# Patient Record
Sex: FEMALE | Race: WHITE | Employment: OTHER | URBAN - METROPOLITAN AREA
[De-identification: names, ages, dates, MRNs, and addresses within clinical notes are randomized per-mention and may not be internally consistent; named-entity substitution may affect disease eponyms.]

---

## 2021-12-07 ENCOUNTER — OFFICE VISIT (OUTPATIENT)
Dept: FAMILY MEDICINE CLINIC | Age: 76
End: 2021-12-07
Payer: MEDICARE

## 2021-12-07 VITALS
RESPIRATION RATE: 14 BRPM | HEART RATE: 68 BPM | HEIGHT: 57 IN | TEMPERATURE: 97.5 F | DIASTOLIC BLOOD PRESSURE: 80 MMHG | SYSTOLIC BLOOD PRESSURE: 126 MMHG | WEIGHT: 113 LBS | OXYGEN SATURATION: 95 % | BODY MASS INDEX: 24.38 KG/M2

## 2021-12-07 DIAGNOSIS — I10 PRIMARY HYPERTENSION: Primary | ICD-10-CM

## 2021-12-07 PROCEDURE — 1036F TOBACCO NON-USER: CPT | Performed by: FAMILY MEDICINE

## 2021-12-07 PROCEDURE — 1123F ACP DISCUSS/DSCN MKR DOCD: CPT | Performed by: FAMILY MEDICINE

## 2021-12-07 PROCEDURE — G8420 CALC BMI NORM PARAMETERS: HCPCS | Performed by: FAMILY MEDICINE

## 2021-12-07 PROCEDURE — G8400 PT W/DXA NO RESULTS DOC: HCPCS | Performed by: FAMILY MEDICINE

## 2021-12-07 PROCEDURE — 4040F PNEUMOC VAC/ADMIN/RCVD: CPT | Performed by: FAMILY MEDICINE

## 2021-12-07 PROCEDURE — 99203 OFFICE O/P NEW LOW 30 MIN: CPT | Performed by: FAMILY MEDICINE

## 2021-12-07 PROCEDURE — G8427 DOCREV CUR MEDS BY ELIG CLIN: HCPCS | Performed by: FAMILY MEDICINE

## 2021-12-07 PROCEDURE — 1090F PRES/ABSN URINE INCON ASSESS: CPT | Performed by: FAMILY MEDICINE

## 2021-12-07 PROCEDURE — G8484 FLU IMMUNIZE NO ADMIN: HCPCS | Performed by: FAMILY MEDICINE

## 2021-12-07 RX ORDER — ASPIRIN 81 MG/1
81 TABLET ORAL DAILY
COMMUNITY

## 2021-12-07 RX ORDER — LISINOPRIL AND HYDROCHLOROTHIAZIDE 20; 12.5 MG/1; MG/1
1 TABLET ORAL DAILY
Status: ON HOLD | COMMUNITY
Start: 2021-09-23 | End: 2022-03-16 | Stop reason: HOSPADM

## 2021-12-07 SDOH — ECONOMIC STABILITY: FOOD INSECURITY: WITHIN THE PAST 12 MONTHS, THE FOOD YOU BOUGHT JUST DIDN'T LAST AND YOU DIDN'T HAVE MONEY TO GET MORE.: NEVER TRUE

## 2021-12-07 SDOH — ECONOMIC STABILITY: FOOD INSECURITY: WITHIN THE PAST 12 MONTHS, YOU WORRIED THAT YOUR FOOD WOULD RUN OUT BEFORE YOU GOT MONEY TO BUY MORE.: NEVER TRUE

## 2021-12-07 ASSESSMENT — PATIENT HEALTH QUESTIONNAIRE - PHQ9
2. FEELING DOWN, DEPRESSED OR HOPELESS: 0
SUM OF ALL RESPONSES TO PHQ9 QUESTIONS 1 & 2: 0
1. LITTLE INTEREST OR PLEASURE IN DOING THINGS: 0
SUM OF ALL RESPONSES TO PHQ QUESTIONS 1-9: 0

## 2021-12-07 ASSESSMENT — SOCIAL DETERMINANTS OF HEALTH (SDOH): HOW HARD IS IT FOR YOU TO PAY FOR THE VERY BASICS LIKE FOOD, HOUSING, MEDICAL CARE, AND HEATING?: NOT HARD AT ALL

## 2021-12-07 NOTE — PROGRESS NOTES
12/7/2021    Chief Complaint   Patient presents with   [facility] Doctor     referred by melissa MORRISON    Liane Rubio is a 67 y.o. patient that presents today for:    Hypertension: Patient is here for follow up chronic hypertension. Patient is  compliant with lifestyle modifications. Patient is  well controlled. Patient denies chest pain, diaphoresis, dyspnea, dyspnea on exertion, peripheral edema, palpitations, HA, visual issues. Taking meds as prescribed. No adverse effects. Patient's past medical, surgical, social and/or family history reviewed, updated in chart, and are non-contributory (unless otherwise stated). Medications and allergies also reviewed and updated in chart. ROS negative unless otherwise specified    Physical Exam  Temp Readings from Last 3 Encounters:   12/07/21 97.5 °F (36.4 °C)     Wt Readings from Last 3 Encounters:   12/07/21 113 lb (51.3 kg)     BP Readings from Last 3 Encounters:   12/07/21 126/80     Pulse Readings from Last 3 Encounters:   12/07/21 68       General appearance: alert, well appearing, and in no distress, oriented to person, place, and time and normal appearing weight. CVS exam: normal rate, regular rhythm, normal S1, S2, no murmurs, rubs, clicks or gallops. Radial pulses 2+ bilateral.  PT/DP pulse 2+ bilat. No C/C/E    Chest: clear to auscultation, no wheezes, rales or rhonchi, symmetric air entry. Abdomen: Soft, non-tender, non-distended, positive BS in all 4 quadrants    Extremities:Dorsalis pedis pulses palpated bilaterally, no clubbing, cyanosis, edema or erythema,     SKIN: no lesions, jaundice, petechiae, pallor, cyanosis, ecchymosis    NEURO: gross motor exam normal by observation, gait normal    Mental status - alert, oriented to person, place, and time, normal mood, behavior, speech, dress, motor activity, and thought processes      Assessment/Plan  Naima Ramos was seen today for established new doctor.     Diagnoses and all orders for this visit:    Primary hypertension  -     CBC; Future  -     Comprehensive Metabolic Panel; Future  -     Lipid Panel; Future  -     TSH without Reflex; Future          Return in about 6 months (around 6/7/2022) for AWV, schedule labs in 1 month . Libby Camejo, DO    Call or go to ED immediately if symptoms worsen or persist.    Educational materials and/or home exercises printed for patient's review and were included in patient instructions on his/her After Visit Summary and given to patient at the end of visit. Counseled regarding above diagnosis, including possible risks and complications,  especially if left uncontrolled. Counseled regarding the possible side effects, risks, benefits and alternatives to treatment; patient and/or guardian verbalizes understanding, agrees, feels comfortable with and wishes to proceed with above treatment plan. Advised patient to call with any new medication issues, and read all Rx info from pharmacy to assure aware of all possible risks and side effects of medication before taking. Reviewed age and gender appropriate health screening exams and vaccinations. Advised patient regarding importance of keeping up with recommended health maintenance and to schedule as soon as possible if overdue, as this is important in assessing for undiagnosed pathology, especially cancer, as well as protecting against potentially harmful/life threatening disease. Patient and/or guardian verbalizes understanding and agrees with above counseling, assessment and plan. All questions answered.

## 2022-01-05 DIAGNOSIS — I10 PRIMARY HYPERTENSION: ICD-10-CM

## 2022-01-05 LAB
ALBUMIN SERPL-MCNC: 3.9 G/DL (ref 3.5–5.2)
ALP BLD-CCNC: 56 U/L (ref 35–104)
ALT SERPL-CCNC: 36 U/L (ref 0–32)
ANION GAP SERPL CALCULATED.3IONS-SCNC: 12 MMOL/L (ref 7–16)
AST SERPL-CCNC: 32 U/L (ref 0–31)
BILIRUB SERPL-MCNC: 0.6 MG/DL (ref 0–1.2)
BUN BLDV-MCNC: 13 MG/DL (ref 6–23)
CALCIUM SERPL-MCNC: 9.3 MG/DL (ref 8.6–10.2)
CHLORIDE BLD-SCNC: 102 MMOL/L (ref 98–107)
CHOLESTEROL, TOTAL: 234 MG/DL (ref 0–199)
CO2: 25 MMOL/L (ref 22–29)
CREAT SERPL-MCNC: 0.8 MG/DL (ref 0.5–1)
GFR AFRICAN AMERICAN: >60
GFR NON-AFRICAN AMERICAN: >60 ML/MIN/1.73
GLUCOSE BLD-MCNC: 98 MG/DL (ref 74–99)
HCT VFR BLD CALC: 38 % (ref 34–48)
HDLC SERPL-MCNC: 70 MG/DL
HEMOGLOBIN: 12.2 G/DL (ref 11.5–15.5)
LDL CHOLESTEROL CALCULATED: 147 MG/DL (ref 0–99)
MCH RBC QN AUTO: 29.3 PG (ref 26–35)
MCHC RBC AUTO-ENTMCNC: 32.1 % (ref 32–34.5)
MCV RBC AUTO: 91.1 FL (ref 80–99.9)
PDW BLD-RTO: 14.1 FL (ref 11.5–15)
PLATELET # BLD: 305 E9/L (ref 130–450)
PMV BLD AUTO: 10.6 FL (ref 7–12)
POTASSIUM SERPL-SCNC: 3.8 MMOL/L (ref 3.5–5)
RBC # BLD: 4.17 E12/L (ref 3.5–5.5)
SODIUM BLD-SCNC: 139 MMOL/L (ref 132–146)
TOTAL PROTEIN: 6.5 G/DL (ref 6.4–8.3)
TRIGL SERPL-MCNC: 87 MG/DL (ref 0–149)
TSH SERPL DL<=0.05 MIU/L-ACNC: 2.17 UIU/ML (ref 0.27–4.2)
VLDLC SERPL CALC-MCNC: 17 MG/DL
WBC # BLD: 6.7 E9/L (ref 4.5–11.5)

## 2022-03-11 ENCOUNTER — APPOINTMENT (OUTPATIENT)
Dept: GENERAL RADIOLOGY | Age: 77
DRG: 280 | End: 2022-03-11
Payer: MEDICARE

## 2022-03-11 ENCOUNTER — APPOINTMENT (OUTPATIENT)
Dept: CT IMAGING | Age: 77
DRG: 280 | End: 2022-03-11
Payer: MEDICARE

## 2022-03-11 ENCOUNTER — HOSPITAL ENCOUNTER (INPATIENT)
Age: 77
LOS: 4 days | Discharge: HOME | DRG: 280 | End: 2022-03-16
Attending: EMERGENCY MEDICINE | Admitting: INTERNAL MEDICINE
Payer: MEDICARE

## 2022-03-11 DIAGNOSIS — R06.09 DYSPNEA ON EXERTION: ICD-10-CM

## 2022-03-11 DIAGNOSIS — I24.9 ACS (ACUTE CORONARY SYNDROME) (HCC): Primary | ICD-10-CM

## 2022-03-11 DIAGNOSIS — I31.39 PERICARDIAL EFFUSION: ICD-10-CM

## 2022-03-11 LAB
ALBUMIN SERPL-MCNC: 3.8 G/DL (ref 3.5–5.2)
ALP BLD-CCNC: 88 U/L (ref 35–104)
ALT SERPL-CCNC: 174 U/L (ref 0–32)
ANION GAP SERPL CALCULATED.3IONS-SCNC: 16 MMOL/L (ref 7–16)
APTT: 22.2 SEC (ref 24.5–35.1)
AST SERPL-CCNC: 173 U/L (ref 0–31)
BASOPHILS ABSOLUTE: 0.14 E9/L (ref 0–0.2)
BASOPHILS RELATIVE PERCENT: 1.2 % (ref 0–2)
BILIRUB SERPL-MCNC: 0.6 MG/DL (ref 0–1.2)
BUN BLDV-MCNC: 18 MG/DL (ref 6–23)
CALCIUM SERPL-MCNC: 9.3 MG/DL (ref 8.6–10.2)
CHLORIDE BLD-SCNC: 100 MMOL/L (ref 98–107)
CO2: 19 MMOL/L (ref 22–29)
CREAT SERPL-MCNC: 0.7 MG/DL (ref 0.5–1)
EOSINOPHILS ABSOLUTE: 0.2 E9/L (ref 0.05–0.5)
EOSINOPHILS RELATIVE PERCENT: 1.7 % (ref 0–6)
GFR AFRICAN AMERICAN: >60
GFR NON-AFRICAN AMERICAN: >60 ML/MIN/1.73
GLUCOSE BLD-MCNC: 127 MG/DL (ref 74–99)
HCT VFR BLD CALC: 43.5 % (ref 34–48)
HEMOGLOBIN: 14.3 G/DL (ref 11.5–15.5)
IMMATURE GRANULOCYTES #: 0.05 E9/L
IMMATURE GRANULOCYTES %: 0.4 % (ref 0–5)
INR BLD: 1
LYMPHOCYTES ABSOLUTE: 1.96 E9/L (ref 1.5–4)
LYMPHOCYTES RELATIVE PERCENT: 16.7 % (ref 20–42)
MCH RBC QN AUTO: 28.8 PG (ref 26–35)
MCHC RBC AUTO-ENTMCNC: 32.9 % (ref 32–34.5)
MCV RBC AUTO: 87.5 FL (ref 80–99.9)
MONOCYTES ABSOLUTE: 1.01 E9/L (ref 0.1–0.95)
MONOCYTES RELATIVE PERCENT: 8.6 % (ref 2–12)
NEUTROPHILS ABSOLUTE: 8.35 E9/L (ref 1.8–7.3)
NEUTROPHILS RELATIVE PERCENT: 71.4 % (ref 43–80)
PDW BLD-RTO: 14.8 FL (ref 11.5–15)
PLATELET # BLD: 346 E9/L (ref 130–450)
PMV BLD AUTO: 10.5 FL (ref 7–12)
POTASSIUM REFLEX MAGNESIUM: 4.1 MMOL/L (ref 3.5–5)
PRO-BNP: ABNORMAL PG/ML (ref 0–450)
PROTHROMBIN TIME: 10.8 SEC (ref 9.3–12.4)
RBC # BLD: 4.97 E12/L (ref 3.5–5.5)
SODIUM BLD-SCNC: 135 MMOL/L (ref 132–146)
TOTAL PROTEIN: 6.8 G/DL (ref 6.4–8.3)
TROPONIN, HIGH SENSITIVITY: 352 NG/L (ref 0–9)
WBC # BLD: 11.7 E9/L (ref 4.5–11.5)

## 2022-03-11 PROCEDURE — 99285 EMERGENCY DEPT VISIT HI MDM: CPT

## 2022-03-11 PROCEDURE — 85610 PROTHROMBIN TIME: CPT

## 2022-03-11 PROCEDURE — 6360000002 HC RX W HCPCS: Performed by: EMERGENCY MEDICINE

## 2022-03-11 PROCEDURE — 84484 ASSAY OF TROPONIN QUANT: CPT

## 2022-03-11 PROCEDURE — 71046 X-RAY EXAM CHEST 2 VIEWS: CPT

## 2022-03-11 PROCEDURE — 83880 ASSAY OF NATRIURETIC PEPTIDE: CPT

## 2022-03-11 PROCEDURE — 80053 COMPREHEN METABOLIC PANEL: CPT

## 2022-03-11 PROCEDURE — 36415 COLL VENOUS BLD VENIPUNCTURE: CPT

## 2022-03-11 PROCEDURE — 85025 COMPLETE CBC W/AUTO DIFF WBC: CPT

## 2022-03-11 PROCEDURE — 85730 THROMBOPLASTIN TIME PARTIAL: CPT

## 2022-03-11 PROCEDURE — 96374 THER/PROPH/DIAG INJ IV PUSH: CPT

## 2022-03-11 PROCEDURE — 71275 CT ANGIOGRAPHY CHEST: CPT

## 2022-03-11 PROCEDURE — 6360000004 HC RX CONTRAST MEDICATION: Performed by: RADIOLOGY

## 2022-03-11 PROCEDURE — 6370000000 HC RX 637 (ALT 250 FOR IP): Performed by: EMERGENCY MEDICINE

## 2022-03-11 RX ORDER — HEPARIN SODIUM 1000 [USP'U]/ML
2900 INJECTION, SOLUTION INTRAVENOUS; SUBCUTANEOUS PRN
Status: DISCONTINUED | OUTPATIENT
Start: 2022-03-11 | End: 2022-03-12

## 2022-03-11 RX ORDER — HEPARIN SODIUM 10000 [USP'U]/100ML
5-30 INJECTION, SOLUTION INTRAVENOUS CONTINUOUS
Status: DISCONTINUED | OUTPATIENT
Start: 2022-03-11 | End: 2022-03-12

## 2022-03-11 RX ORDER — ASPIRIN 81 MG/1
324 TABLET, CHEWABLE ORAL ONCE
Status: COMPLETED | OUTPATIENT
Start: 2022-03-11 | End: 2022-03-11

## 2022-03-11 RX ORDER — HEPARIN SODIUM 1000 [USP'U]/ML
2900 INJECTION, SOLUTION INTRAVENOUS; SUBCUTANEOUS ONCE
Status: COMPLETED | OUTPATIENT
Start: 2022-03-11 | End: 2022-03-11

## 2022-03-11 RX ORDER — HEPARIN SODIUM 1000 [USP'U]/ML
1500 INJECTION, SOLUTION INTRAVENOUS; SUBCUTANEOUS PRN
Status: DISCONTINUED | OUTPATIENT
Start: 2022-03-11 | End: 2022-03-12

## 2022-03-11 RX ADMIN — HEPARIN SODIUM 12 UNITS/KG/HR: 10000 INJECTION, SOLUTION INTRAVENOUS at 17:54

## 2022-03-11 RX ADMIN — IOPAMIDOL 75 ML: 755 INJECTION, SOLUTION INTRAVENOUS at 18:54

## 2022-03-11 RX ADMIN — HEPARIN SODIUM 2900 UNITS: 1000 INJECTION, SOLUTION INTRAVENOUS; SUBCUTANEOUS at 17:53

## 2022-03-11 RX ADMIN — ASPIRIN 81 MG 324 MG: 81 TABLET ORAL at 17:46

## 2022-03-11 NOTE — ED PROVIDER NOTES
HPI:  3/11/22,   Time: 4:05 PM JESSY Raymond is a 67 y.o. female presenting to the ED for sob, beginning 1 month ago. The complaint has been intermittent, mild in severity, and worsened by moderate exertion. Better with rest, no cp/n/v/d/abd pain/cough/congestion/fever/chills/sweats. No hx same. No cardiac hx. Never had stress test.  [employer] private vehicle    Review of Systems:   Pertinent positives and negatives are stated within HPI, all other systems reviewed and are negative.          --------------------------------------------- PAST HISTORY ---------------------------------------------  Past Medical History:  has a past medical history of Hypertension. Past Surgical History:  has a past surgical history that includes Hysterectomy, total abdominal and hernia repair. Social History:  reports that she has never smoked. She has never used smokeless tobacco. She reports previous alcohol use. She reports that she does not use drugs. Family History: family history includes Heart Attack in her father; Stroke in her mother. The patients home medications have been reviewed. Allergies: Patient has no known allergies. ---------------------------------------------------PHYSICAL EXAM--------------------------------------    Constitutional/General: Alert and oriented x3, well appearing, non toxic in NAD  Head: Normocephalic and atraumatic  Eyes: PERRL, EOMI, conjunctive normal, sclera non icteric  Mouth: Oropharynx clear, handling secretions,   Neck: Supple, full ROM,   Respiratory: Lungs clear to auscultation bilaterally, no wheezes, rales, or rhonchi. Not in respiratory distress  Cardiovascular:  Regular rate. Regular rhythm. . 2+ distal pulses  Chest: No chest wall tenderness  GI:  Abdomen Soft, Non tender, Non distended. Musculoskeletal: Moves all extremities x 4. Warm and well perfused, no clubbing, cyanosis, or edema. Capillary refill <3 seconds  Integument: skin warm and dry. No rashes. Lymphatic: no lymphadenopathy noted  Neurologic: GCS 15, no focal deficits,   Psychiatric: Normal Affect    -------------------------------------------------- RESULTS -------------------------------------------------  I have personally reviewed all laboratory and imaging results for this patient. Results are listed below.      LABS:  Results for orders placed or performed during the hospital encounter of 03/11/22   CBC with Auto Differential   Result Value Ref Range    WBC 11.7 (H) 4.5 - 11.5 E9/L    RBC 4.97 3.50 - 5.50 E12/L    Hemoglobin 14.3 11.5 - 15.5 g/dL    Hematocrit 43.5 34.0 - 48.0 %    MCV 87.5 80.0 - 99.9 fL    MCH 28.8 26.0 - 35.0 pg    MCHC 32.9 32.0 - 34.5 %    RDW 14.8 11.5 - 15.0 fL    Platelets 042 255 - 788 E9/L    MPV 10.5 7.0 - 12.0 fL    Neutrophils % 71.4 43.0 - 80.0 %    Immature Granulocytes % 0.4 0.0 - 5.0 %    Lymphocytes % 16.7 (L) 20.0 - 42.0 %    Monocytes % 8.6 2.0 - 12.0 %    Eosinophils % 1.7 0.0 - 6.0 %    Basophils % 1.2 0.0 - 2.0 %    Neutrophils Absolute 8.35 (H) 1.80 - 7.30 E9/L    Immature Granulocytes # 0.05 E9/L    Lymphocytes Absolute 1.96 1.50 - 4.00 E9/L    Monocytes Absolute 1.01 (H) 0.10 - 0.95 E9/L    Eosinophils Absolute 0.20 0.05 - 0.50 E9/L    Basophils Absolute 0.14 0.00 - 0.20 E9/L   Comprehensive Metabolic Panel w/ Reflex to MG   Result Value Ref Range    Sodium 135 132 - 146 mmol/L    Potassium reflex Magnesium 4.1 3.5 - 5.0 mmol/L    Chloride 100 98 - 107 mmol/L    CO2 19 (L) 22 - 29 mmol/L    Anion Gap 16 7 - 16 mmol/L    Glucose 127 (H) 74 - 99 mg/dL    BUN 18 6 - 23 mg/dL    CREATININE 0.7 0.5 - 1.0 mg/dL    GFR Non-African American >60 >=60 mL/min/1.73    GFR African American >60     Calcium 9.3 8.6 - 10.2 mg/dL    Total Protein 6.8 6.4 - 8.3 g/dL    Albumin 3.8 3.5 - 5.2 g/dL    Total Bilirubin 0.6 0.0 - 1.2 mg/dL    Alkaline Phosphatase 88 35 - 104 U/L     (H) 0 - 32 U/L     (H) 0 - 31 U/L   Troponin   Result Value Ref Range Troponin, High Sensitivity 352 (H) 0 - 9 ng/L   Brain Natriuretic Peptide   Result Value Ref Range    Pro-BNP 13,518 (H) 0 - 450 pg/mL   APTT   Result Value Ref Range    aPTT 22.2 (L) 24.5 - 35.1 sec   Protime-INR   Result Value Ref Range    Protime 10.8 9.3 - 12.4 sec    INR 1.0        RADIOLOGY:  Interpreted by Radiologist.  CTA PULMONARY W CONTRAST   Final Result   No evidence of pulmonary embolism. Cardiomegaly with pericardial effusion. Pleural effusions right greater than left. Nonspecific patchy focus of ground-glass appearance right upper lobe. XR CHEST (2 VW)   Final Result   CHF. Probable platelike atelectasis in the left lower lung field. Rule   focal lung collapse versus pneumonia in the medial right lower lung field. EKG:  This EKG is signed and interpreted by the EP. Time:   Rate:   Rhythm:   Interpretation:   Comparison:       ------------------------- NURSING NOTES AND VITALS REVIEWED ---------------------------   The nursing notes within the ED encounter and vital signs as below have been reviewed by myself. /60   Pulse 100   Temp 98.6 °F (37 °C) (Oral)   Resp 16   Ht 4' 9\" (1.448 m)   Wt 108 lb (49 kg)   SpO2 97%   BMI 23.37 kg/m²   Oxygen Saturation Interpretation: Normal    The patients available past medical records and past encounters were reviewed.         ------------------------------ ED COURSE/MEDICAL DECISION MAKING----------------------  Medications   heparin (porcine) injection 2,900 Units (has no administration in time range)   heparin (porcine) injection 1,500 Units (has no administration in time range)   heparin 25,000 units in dextrose 5% 250 mL (premix) infusion (12 Units/kg/hr × 49 kg IntraVENous New Bag 3/11/22 0101)   aspirin chewable tablet 324 mg (324 mg Oral Given 3/11/22 9477)   heparin (porcine) injection 2,900 Units (2,900 Units IntraVENous Given 3/11/22 3587)   iopamidol (ISOVUE-370) 76 % injection 75 mL (75 mLs IntraVENous Given 3/11/22 5404)         ED COURSE:       Medical Decision Making:    No cp in ed, elev trop, jean-baptiste for past month, concern for acs, cards consulted, rec heparin/cta to eval for pe.  cta no pe, will transfer to [facility]. Pericardial effusion noted on cta, reconsult cards, ok to cont heparin gtt as vss    This patient's ED course included: a personal history and physicial examination    This patient has remained hemodynamically stable during their ED course. Re-Evaluations:             Re-evaluation. Patients symptoms show no change    Re-examination  3/11/22   705 PM EST          Vital Signs:   Vitals:    03/11/22 1520 03/11/22 1550 03/11/22 1907   BP: 110/69  120/60   Pulse: 84  100   Resp: 16  16   Temp: 98.6 °F (37 °C)     TempSrc: Oral     SpO2: 98%  97%   Weight:  108 lb (49 kg)    Height:  4' 9\" (1.448 m)            Consultations:             Cardiology - dr Courtney eubanks    Critical Care: 35 min        Counseling: The emergency provider has spoken with the patient and spouse/SO and discussed todays results, in addition to providing specific details for the plan of care and counseling regarding the diagnosis and prognosis. Questions are answered at this time and they are agreeable with the plan.       --------------------------------- IMPRESSION AND DISPOSITION ---------------------------------    IMPRESSION  1. ACS (acute coronary syndrome) ([address]    2. Pericardial effusion    3. Dyspnea on exertion        DISPOSITION  Disposition: Admit to telemetry  Patient condition is stable    NOTE: This report was transcribed using voice recognition software.  Every effort was made to ensure accuracy; however, inadvertent computerized transcription errors may be present        Dewey Swift MD  03/11/22 3652

## 2022-03-12 PROBLEM — I21.4 NSTEMI (NON-ST ELEVATED MYOCARDIAL INFARCTION) (HCC): Status: ACTIVE | Noted: 2022-03-12

## 2022-03-12 LAB
ALBUMIN SERPL-MCNC: 3.2 G/DL (ref 3.5–5.2)
ALP BLD-CCNC: 63 U/L (ref 35–104)
ALT SERPL-CCNC: 137 U/L (ref 0–32)
ANION GAP SERPL CALCULATED.3IONS-SCNC: 13 MMOL/L (ref 7–16)
APTT: 51.7 SEC (ref 24.5–35.1)
AST SERPL-CCNC: 106 U/L (ref 0–31)
BACTERIA: ABNORMAL /HPF
BASOPHILS ABSOLUTE: 0.14 E9/L (ref 0–0.2)
BASOPHILS RELATIVE PERCENT: 1.6 % (ref 0–2)
BILIRUB SERPL-MCNC: 0.5 MG/DL (ref 0–1.2)
BILIRUBIN URINE: NEGATIVE
BLOOD, URINE: ABNORMAL
BUN BLDV-MCNC: 15 MG/DL (ref 6–23)
CALCIUM SERPL-MCNC: 8.4 MG/DL (ref 8.6–10.2)
CHLORIDE BLD-SCNC: 100 MMOL/L (ref 98–107)
CLARITY: CLEAR
CO2: 22 MMOL/L (ref 22–29)
COLOR: YELLOW
CREAT SERPL-MCNC: 0.5 MG/DL (ref 0.5–1)
EOSINOPHILS ABSOLUTE: 0.11 E9/L (ref 0.05–0.5)
EOSINOPHILS RELATIVE PERCENT: 1.2 % (ref 0–6)
GFR AFRICAN AMERICAN: >60
GFR NON-AFRICAN AMERICAN: >60 ML/MIN/1.73
GLUCOSE BLD-MCNC: 114 MG/DL (ref 74–99)
GLUCOSE URINE: NEGATIVE MG/DL
HCT VFR BLD CALC: 35.2 % (ref 34–48)
HEMOGLOBIN: 11.7 G/DL (ref 11.5–15.5)
IMMATURE GRANULOCYTES #: 0.02 E9/L
IMMATURE GRANULOCYTES %: 0.2 % (ref 0–5)
KETONES, URINE: 15 MG/DL
LEUKOCYTE ESTERASE, URINE: NEGATIVE
LV EF: 25 %
LVEF MODALITY: NORMAL
LYMPHOCYTES ABSOLUTE: 1.46 E9/L (ref 1.5–4)
LYMPHOCYTES RELATIVE PERCENT: 16.4 % (ref 20–42)
MCH RBC QN AUTO: 28.5 PG (ref 26–35)
MCHC RBC AUTO-ENTMCNC: 33.2 % (ref 32–34.5)
MCV RBC AUTO: 85.6 FL (ref 80–99.9)
MONOCYTES ABSOLUTE: 0.77 E9/L (ref 0.1–0.95)
MONOCYTES RELATIVE PERCENT: 8.6 % (ref 2–12)
NEUTROPHILS ABSOLUTE: 6.41 E9/L (ref 1.8–7.3)
NEUTROPHILS RELATIVE PERCENT: 72 % (ref 43–80)
NITRITE, URINE: NEGATIVE
PDW BLD-RTO: 14.3 FL (ref 11.5–15)
PH UA: 6 (ref 5–9)
PLATELET # BLD: 282 E9/L (ref 130–450)
PMV BLD AUTO: 10.3 FL (ref 7–12)
POTASSIUM SERPL-SCNC: 3.2 MMOL/L (ref 3.5–5)
PROTEIN UA: NEGATIVE MG/DL
RBC # BLD: 4.11 E12/L (ref 3.5–5.5)
RBC UA: ABNORMAL /HPF (ref 0–2)
SODIUM BLD-SCNC: 135 MMOL/L (ref 132–146)
SPECIFIC GRAVITY UA: 1.01 (ref 1–1.03)
TOTAL PROTEIN: 5.4 G/DL (ref 6.4–8.3)
TROPONIN, HIGH SENSITIVITY: 227 NG/L (ref 0–9)
TROPONIN, HIGH SENSITIVITY: 227 NG/L (ref 0–9)
UROBILINOGEN, URINE: 0.2 E.U./DL
WBC # BLD: 8.9 E9/L (ref 4.5–11.5)
WBC UA: ABNORMAL /HPF (ref 0–5)

## 2022-03-12 PROCEDURE — 81001 URINALYSIS AUTO W/SCOPE: CPT

## 2022-03-12 PROCEDURE — 99223 1ST HOSP IP/OBS HIGH 75: CPT | Performed by: INTERNAL MEDICINE

## 2022-03-12 PROCEDURE — 80053 COMPREHEN METABOLIC PANEL: CPT

## 2022-03-12 PROCEDURE — 2140000000 HC CCU INTERMEDIATE R&B

## 2022-03-12 PROCEDURE — 93005 ELECTROCARDIOGRAM TRACING: CPT | Performed by: INTERNAL MEDICINE

## 2022-03-12 PROCEDURE — 6360000002 HC RX W HCPCS: Performed by: EMERGENCY MEDICINE

## 2022-03-12 PROCEDURE — 36415 COLL VENOUS BLD VENIPUNCTURE: CPT

## 2022-03-12 PROCEDURE — 6360000002 HC RX W HCPCS: Performed by: PHYSICIAN ASSISTANT

## 2022-03-12 PROCEDURE — 85025 COMPLETE CBC W/AUTO DIFF WBC: CPT

## 2022-03-12 PROCEDURE — APPSS60 APP SPLIT SHARED TIME 46-60 MINUTES: Performed by: PHYSICIAN ASSISTANT

## 2022-03-12 PROCEDURE — 85730 THROMBOPLASTIN TIME PARTIAL: CPT

## 2022-03-12 PROCEDURE — 87088 URINE BACTERIA CULTURE: CPT

## 2022-03-12 PROCEDURE — 6370000000 HC RX 637 (ALT 250 FOR IP): Performed by: PHYSICIAN ASSISTANT

## 2022-03-12 PROCEDURE — 6370000000 HC RX 637 (ALT 250 FOR IP): Performed by: INTERNAL MEDICINE

## 2022-03-12 PROCEDURE — 84484 ASSAY OF TROPONIN QUANT: CPT

## 2022-03-12 RX ORDER — METOPROLOL SUCCINATE 25 MG/1
25 TABLET, EXTENDED RELEASE ORAL DAILY
Status: DISCONTINUED | OUTPATIENT
Start: 2022-03-12 | End: 2022-03-16 | Stop reason: HOSPADM

## 2022-03-12 RX ORDER — ROSUVASTATIN CALCIUM 20 MG/1
20 TABLET, COATED ORAL NIGHTLY
Status: DISCONTINUED | OUTPATIENT
Start: 2022-03-12 | End: 2022-03-12

## 2022-03-12 RX ORDER — FUROSEMIDE 10 MG/ML
40 INJECTION INTRAMUSCULAR; INTRAVENOUS DAILY
Status: DISCONTINUED | OUTPATIENT
Start: 2022-03-12 | End: 2022-03-12

## 2022-03-12 RX ORDER — ASPIRIN 81 MG/1
81 TABLET ORAL DAILY
Status: DISCONTINUED | OUTPATIENT
Start: 2022-03-12 | End: 2022-03-16 | Stop reason: HOSPADM

## 2022-03-12 RX ORDER — LISINOPRIL 10 MG/1
10 TABLET ORAL DAILY
Status: DISCONTINUED | OUTPATIENT
Start: 2022-03-13 | End: 2022-03-16 | Stop reason: HOSPADM

## 2022-03-12 RX ORDER — POTASSIUM CHLORIDE 20 MEQ/1
20 TABLET, EXTENDED RELEASE ORAL
Status: DISCONTINUED | OUTPATIENT
Start: 2022-03-13 | End: 2022-03-16 | Stop reason: HOSPADM

## 2022-03-12 RX ORDER — LISINOPRIL 20 MG/1
20 TABLET ORAL DAILY
Status: DISCONTINUED | OUTPATIENT
Start: 2022-03-12 | End: 2022-03-12

## 2022-03-12 RX ORDER — HYDROCHLOROTHIAZIDE 12.5 MG/1
12.5 TABLET ORAL DAILY
Status: DISCONTINUED | OUTPATIENT
Start: 2022-03-12 | End: 2022-03-12

## 2022-03-12 RX ORDER — POTASSIUM CHLORIDE 20 MEQ/1
40 TABLET, EXTENDED RELEASE ORAL ONCE
Status: COMPLETED | OUTPATIENT
Start: 2022-03-12 | End: 2022-03-12

## 2022-03-12 RX ORDER — TORSEMIDE 20 MG/1
20 TABLET ORAL DAILY
Status: DISCONTINUED | OUTPATIENT
Start: 2022-03-13 | End: 2022-03-12

## 2022-03-12 RX ORDER — TORSEMIDE 20 MG/1
10 TABLET ORAL DAILY
Status: DISCONTINUED | OUTPATIENT
Start: 2022-03-13 | End: 2022-03-16 | Stop reason: HOSPADM

## 2022-03-12 RX ORDER — UBIDECARENONE 75 MG
50 CAPSULE ORAL DAILY
COMMUNITY

## 2022-03-12 RX ORDER — LISINOPRIL AND HYDROCHLOROTHIAZIDE 20; 12.5 MG/1; MG/1
1 TABLET ORAL DAILY
Status: DISCONTINUED | OUTPATIENT
Start: 2022-03-12 | End: 2022-03-12 | Stop reason: CLARIF

## 2022-03-12 RX ADMIN — HYDROCHLOROTHIAZIDE 12.5 MG: 12.5 TABLET ORAL at 09:30

## 2022-03-12 RX ADMIN — FUROSEMIDE 40 MG: 10 INJECTION, SOLUTION INTRAMUSCULAR; INTRAVENOUS at 12:19

## 2022-03-12 RX ADMIN — ASPIRIN 81 MG: 81 TABLET, COATED ORAL at 09:30

## 2022-03-12 RX ADMIN — HEPARIN SODIUM 12 UNITS/KG/HR: 10000 INJECTION, SOLUTION INTRAVENOUS at 00:24

## 2022-03-12 RX ADMIN — POTASSIUM CHLORIDE 40 MEQ: 20 TABLET, EXTENDED RELEASE ORAL at 06:54

## 2022-03-12 RX ADMIN — LISINOPRIL 20 MG: 20 TABLET ORAL at 09:30

## 2022-03-12 RX ADMIN — METOPROLOL SUCCINATE 25 MG: 25 TABLET, EXTENDED RELEASE ORAL at 12:19

## 2022-03-12 ASSESSMENT — PAIN SCALES - GENERAL
PAINLEVEL_OUTOF10: 0

## 2022-03-12 NOTE — PLAN OF CARE
Problem: Falls - Risk of:  Goal: Will remain free from falls  Description: Will remain free from falls  Outcome: Met This Shift  Goal: Absence of physical injury  Description: Absence of physical injury  Outcome: Met This Shift     Problem: Gas Exchange - Impaired:  Goal: Levels of oxygenation will improve  Description: Levels of oxygenation will improve  Outcome: Met This Shift

## 2022-03-12 NOTE — H&P
[facility] Internal Medicine  History and Physical      CHIEF COMPLAINT: Shortness of breath on exertion    Reason for Admission: Shortness of breath on exertion, possible non-ST elevation MI    History Obtained From: Patient    PCP :  [address] [facility] [address] / [facility] [id number]      HISTORY OF PRESENT ILLNESS:      The patient is a 67 y.o. female presented to the emergency room because of shortness of breath on exertion for the last 2 3 weeks. There has been no chest pain. She was evaluated in the emergency room and was noted to have significantly elevated troponin. EKG showed left bundle branch block. CT angiogram of the chest was performed because of elevated troponin shortness of breath on exertion and this was negative for pulmonary embolism. She does have an underlying history of hypertension. She was then admitted because of suspicion for non-ST relation MI. Past Medical History:        Diagnosis Date    Hypertension      Past Surgical History:        Procedure Laterality Date    HERNIA REPAIR      HYSTERECTOMY, TOTAL ABDOMINAL      JOINT REPLACEMENT  03/12/2019    left knee         Medications Prior to Admission:    Medications Prior to Admission: vitamin B-12 (CYANOCOBALAMIN) 100 MCG tablet, Take 50 mcg by mouth daily  lisinopril-hydroCHLOROthiazide (PRINZIDE;ZESTORETIC) 20-12.5 MG per tablet, 1 tablet daily   aspirin 81 MG EC tablet, Take 81 mg by mouth daily    Allergies:  Patient has no known allergies.     Social History:   Social History     Socioeconomic History    Marital status:      Spouse name: Not on file    Number of children: Not on file    Years of education: Not on file    Highest education level: Not on file   Occupational History    Occupation: CNA   Tobacco Use    Smoking status: Never Smoker    Smokeless tobacco: Never Used   Substance and Sexual Activity    Alcohol use: Not Currently    Drug use: Never    Sexual activity: Not on file   Other Topics Concern    Not on file   Social History Narrative    Not on file     Social Determinants of Health     Financial Resource Strain: Low Risk     Difficulty of Paying Living Expenses: Not hard at all   Food Insecurity: No Food Insecurity    Worried About Running Out of Food in the Last Year: Never true    [address] in the Last Year: Never true   Transportation Needs:     Lack of Transportation (Medical): Not on file    Lack of Transportation (Non-Medical):  Not on file   Physical Activity:     Days of Exercise per Week: Not on file    Minutes of Exercise per Session: Not on file   Stress:     Feeling of Stress : Not on file   Social Connections:     Frequency of Communication with Friends and Family: Not on file    Frequency of Social Gatherings with Friends and Family: Not on file    Attends Buddhist Services: Not on file    Active Member of [address] [place] [address] [employer] or Organizations: Not on file    Attends Club or Organization Meetings: Not on file    Marital Status: Not on file   Intimate Partner Violence:     Fear of Current or Ex-Partner: Not on file    Emotionally Abused: Not on file    Physically Abused: Not on file    Sexually Abused: Not on file   Housing Stability:     Unable to Pay for Housing in the Last Year: Not on file    Number of [place] in the Last Year: Not on file    Unstable Housing in the Last Year: Not on file         Family History:       Problem Relation Age of Onset    Stroke Mother     Heart Attack Father        REVIEW OF SYSTEMS:    General ROS: negative  Hematological and Lymphatic ROS: negative  Endocrine ROS: negative  Respiratory ROS: Shortness of breath on exertion  Cardiovascular ROS: no chest pain or dyspnea on exertion  Gastrointestinal ROS: no abdominal pain, change in bowel habits, or black or bloody stools  Genito-Urinary ROS: no dysuria, trouble voiding, or hematuria  Neurological ROS: no TIA or stroke symptoms  negative    Vitals:  BP (!) 117/91   Pulse 84   Temp 97.3 °F (36.3 °C)   Resp 18   Ht 4' 9\" (1.448 m)   Wt 111 lb 3.2 oz (50.4 kg)   SpO2 95%   BMI 24.06 kg/m²     PHYSICAL EXAM:  General:  Awake, alert, oriented X 3. Well developed, well nourished, well groomed. No apparent distress. HEENT:  Normocephalic, atraumatic. Pupils equal, round, reactive to light. No scleral icterus. No conjunctival injection. Neck:  Supple, no carotid bruits  Heart:  RRR, 3/6 ejection systolic murmur  Lungs:  CTA bilaterally, bilat symmetrical expansion, no wheeze, rales, or rhonchi  Abdomen:   Bowel sounds present, soft, nontender, no masses, no organomegaly, no peritoneal signs  Extremities:  No clubbing, cyanosis, or edema  Skin:  Warm and dry, no open lesions or rash  Neuro:  Cranial nerves 2-12 intact, no focal deficits      DATA:     Recent Results (from the past 24 hour(s))   Urinalysis with Microscopic    Collection Time: 03/11/22  4:00 PM   Result Value Ref Range    Color, UA Yellow Straw/Yellow    Clarity, UA Clear Clear    Glucose, Ur Negative Negative mg/dL    Bilirubin Urine Negative Negative    Ketones, Urine 15 (A) Negative mg/dL    Specific Gravity, UA 1.015 1.005 - 1.030    Blood, Urine SMALL (A) Negative    pH, UA 6.0 5.0 - 9.0    Protein, UA Negative Negative mg/dL    Urobilinogen, Urine 0.2 <2.0 E.U./dL    Nitrite, Urine Negative Negative    Leukocyte Esterase, Urine Negative Negative    WBC, UA 0-1 0 - 5 /HPF    RBC, UA 5-10 (A) 0 - 2 /HPF    Bacteria, UA FEW (A) None Seen /HPF   CBC with Auto Differential    Collection Time: 03/11/22  4:25 PM   Result Value Ref Range    WBC 11.7 (H) 4.5 - 11.5 E9/L    RBC 4.97 3.50 - 5.50 E12/L    Hemoglobin 14.3 11.5 - 15.5 g/dL    Hematocrit 43.5 34.0 - 48.0 %    MCV 87.5 80.0 - 99.9 fL    MCH 28.8 26.0 - 35.0 pg    MCHC 32.9 32.0 - 34.5 %    RDW 14.8 11.5 - 15.0 fL    Platelets 593 532 - 884 E9/L    MPV 10.5 7.0 - 12.0 fL    Neutrophils % 71.4 43.0 - 80.0 %    Immature Granulocytes % 0.4 0.0 - 5.0 %    Lymphocytes % 16.7 (L) 20.0 - 42.0 %    Monocytes % 8.6 2.0 - 12.0 %    Eosinophils % 1.7 0.0 - 6.0 %    Basophils % 1.2 0.0 - 2.0 %    Neutrophils Absolute 8.35 (H) 1.80 - 7.30 E9/L    Immature Granulocytes # 0.05 E9/L    Lymphocytes Absolute 1.96 1.50 - 4.00 E9/L    Monocytes Absolute 1.01 (H) 0.10 - 0.95 E9/L    Eosinophils Absolute 0.20 0.05 - 0.50 E9/L    Basophils Absolute 0.14 0.00 - 0.20 E9/L   Comprehensive Metabolic Panel w/ Reflex to MG    Collection Time: 03/11/22  4:25 PM   Result Value Ref Range    Sodium 135 132 - 146 mmol/L    Potassium reflex Magnesium 4.1 3.5 - 5.0 mmol/L    Chloride 100 98 - 107 mmol/L    CO2 19 (L) 22 - 29 mmol/L    Anion Gap 16 7 - 16 mmol/L    Glucose 127 (H) 74 - 99 mg/dL    BUN 18 6 - 23 mg/dL    CREATININE 0.7 0.5 - 1.0 mg/dL    GFR Non-African American >60 >=60 mL/min/1.73    GFR African American >60     Calcium 9.3 8.6 - 10.2 mg/dL    Total Protein 6.8 6.4 - 8.3 g/dL    Albumin 3.8 3.5 - 5.2 g/dL    Total Bilirubin 0.6 0.0 - 1.2 mg/dL    Alkaline Phosphatase 88 35 - 104 U/L     (H) 0 - 32 U/L     (H) 0 - 31 U/L   Troponin    Collection Time: 03/11/22  4:25 PM   Result Value Ref Range    Troponin, High Sensitivity 352 (H) 0 - 9 ng/L   Brain Natriuretic Peptide    Collection Time: 03/11/22  4:25 PM   Result Value Ref Range    Pro-BNP 13,518 (H) 0 - 450 pg/mL   APTT    Collection Time: 03/11/22  6:03 PM   Result Value Ref Range    aPTT 22.2 (L) 24.5 - 35.1 sec   Protime-INR    Collection Time: 03/11/22  6:03 PM   Result Value Ref Range    Protime 10.8 9.3 - 12.4 sec    INR 1.0    Troponin    Collection Time: 03/12/22  1:10 AM   Result Value Ref Range    Troponin, High Sensitivity 227 (H) 0 - 9 ng/L   Troponin    Collection Time: 03/12/22  5:29 AM   Result Value Ref Range    Troponin, High Sensitivity 227 (H) 0 - 9 ng/L   APTT    Collection Time: 03/12/22  5:29 AM   Result Value Ref Range    aPTT 51.7 (H) 24.5 - 35.1 sec   Comprehensive Metabolic Panel Collection Time: 03/12/22  5:29 AM   Result Value Ref Range    Sodium 135 132 - 146 mmol/L    Potassium 3.2 (L) 3.5 - 5.0 mmol/L    Chloride 100 98 - 107 mmol/L    CO2 22 22 - 29 mmol/L    Anion Gap 13 7 - 16 mmol/L    Glucose 114 (H) 74 - 99 mg/dL    BUN 15 6 - 23 mg/dL    CREATININE 0.5 0.5 - 1.0 mg/dL    GFR Non-African American >60 >=60 mL/min/1.73    GFR African American >60     Calcium 8.4 (L) 8.6 - 10.2 mg/dL    Total Protein 5.4 (L) 6.4 - 8.3 g/dL    Albumin 3.2 (L) 3.5 - 5.2 g/dL    Total Bilirubin 0.5 0.0 - 1.2 mg/dL    Alkaline Phosphatase 63 35 - 104 U/L     (H) 0 - 32 U/L     (H) 0 - 31 U/L   CBC with Auto Differential    Collection Time: 03/12/22  5:29 AM   Result Value Ref Range    WBC 8.9 4.5 - 11.5 E9/L    RBC 4.11 3.50 - 5.50 E12/L    Hemoglobin 11.7 11.5 - 15.5 g/dL    Hematocrit 35.2 34.0 - 48.0 %    MCV 85.6 80.0 - 99.9 fL    MCH 28.5 26.0 - 35.0 pg    MCHC 33.2 32.0 - 34.5 %    RDW 14.3 11.5 - 15.0 fL    Platelets 673 794 - 525 E9/L    MPV 10.3 7.0 - 12.0 fL    Neutrophils % 72.0 43.0 - 80.0 %    Immature Granulocytes % 0.2 0.0 - 5.0 %    Lymphocytes % 16.4 (L) 20.0 - 42.0 %    Monocytes % 8.6 2.0 - 12.0 %    Eosinophils % 1.2 0.0 - 6.0 %    Basophils % 1.6 0.0 - 2.0 %    Neutrophils Absolute 6.41 1.80 - 7.30 E9/L    Immature Granulocytes # 0.02 E9/L    Lymphocytes Absolute 1.46 (L) 1.50 - 4.00 E9/L    Monocytes Absolute 0.77 0.10 - 0.95 E9/L    Eosinophils Absolute 0.11 0.05 - 0.50 E9/L    Basophils Absolute 0.14 0.00 - 0.20 E9/L       CTA PULMONARY W CONTRAST   Final Result   No evidence of pulmonary embolism. Cardiomegaly with pericardial effusion. Pleural effusions right greater than left. Nonspecific patchy focus of ground-glass appearance right upper lobe. XR CHEST (2 VW)   Final Result   CHF. Probable platelike atelectasis in the left lower lung field. Rule   focal lung collapse versus pneumonia in the medial right lower lung field. ASSESSMENT :      Principal Problem:    NSTEMI (non-ST elevated myocardial infarction) ([address]  Resolved Problems:    * No resolved hospital problems. *    Transaminitis question of hepatic congestion  Possible underlying CHF  Possible severe aortic stenosis      Plan :    Cardiology initiated rosuvastatin this will be discontinued  Follow liver functions closely  Being treated for CHF with IV Lasix  Echocardiogram pending  Discussed with patient and family    Electronically signed by Elsy Dan MD on 3/12/2022 at 12:18 PM    NOTE: This report was transcribed using voice recognition software.  Every effort was made to ensure accuracy; however, inadvertent transcription errors may be present

## 2022-03-12 NOTE — CONSULTS
Inpatient [facility] Consultation      Reason for Consult: NSTEMI    Consulting Physician: Dr. Joel Stallworth    Requesting Physician: Dr. Lord Mann     Date of Consultation: 3/12/2022    HISTORY OF PRESENT ILLNESS:   Patient is a 67year old WF not previously known to [facility]. She is being seen in consultation this hospital admission by Dr. Joel Stallworth for evaluation and recommendations regarding NSTEMI. PMH: HTN, and HLD not on statin therapy. Denies any prior cardiac evaluation, as well as history of DM, CAD, MI, CHF, VHD or cardiac arrhythmia. Patient presented to [facility] on March 11, 2022 with complaints of shortness of breath. · Patient states that over the past 2 to 3 weeks, she has noticed new onset dyspnea on exertion. This has progressively worsened. She denies dyspnea at rest, however. She additionally admits to fatigue on exertion, as well as orthopnea. · She denies any complains of chest discomfort at rest or on exertion, nausea, emesis, diaphoresis, dizziness, palpitations, near-syncope or syncope. · Admits to medication compliance      Please note: past medical records were reviewed per electronic medical record (EMR) - see detailed reports under Past Medical/ Surgical History. PAST MEDICAL HISTORY:    · HTN. · HLD, not on statin therapy. PAST SURGICAL HISTORY:    Past Surgical History:   Procedure Laterality Date    HERNIA REPAIR      HYSTERECTOMY, TOTAL ABDOMINAL      JOINT REPLACEMENT  03/12/2019    left knee       HOME MEDICATIONS:  Prior to Admission medications    Medication Sig Start Date End Date Taking?  Authorizing Provider   vitamin B-12 (CYANOCOBALAMIN) 100 MCG tablet Take 50 mcg by mouth daily   Yes Historical Provider, MD   lisinopril-hydroCHLOROthiazide (PRINZIDE;ZESTORETIC) 20-12.5 MG per tablet 1 tablet daily  9/23/21  Yes Historical Provider, MD   aspirin 81 MG EC tablet Take 81 mg by mouth daily   Yes Historical Provider, MD       CURRENT MEDICATIONS: dry.  NEURO / PSYCH: Oriented to person, place and time. Speech clear and appropriate. Follows all commands. Pleasant affect. DATA:    Telemetry: NSR with HR in the 90s    Diagnostic:  All diagnostic testing and lab work thus far this admission reviewed in detail. CXR 3/11/2022:  Impression:  CHF.  Probable platelike atelectasis in the left lower lung field.  Rule  focal lung collapse versus pneumonia in the medial right lower lung field. Chest CTA 3/11/2022:  Impression:  No evidence of pulmonary embolism. Cardiomegaly with moderate pericardial effusion. Pleural effusions right greater than left. Nonspecific patchy focus of ground-glass appearance right upper lobe.       Intake/Output Summary (Last 24 hours) at 3/12/2022 0717  Last data filed at 3/12/2022 3537  Gross per 24 hour   Intake 104.14 ml   Output 300 ml   Net -195.86 ml       Labs:   CBC:   Recent Labs     03/11/22  1625 03/12/22  0529   WBC 11.7* 8.9   HGB 14.3 11.7   HCT 43.5 35.2    282     BMP:   Recent Labs     03/11/22  1625 03/12/22  0529    135   K 4.1 3.2*   CO2 19* 22   BUN 18 15   CREATININE 0.7 0.5   LABGLOM >60 >60   CALCIUM 9.3 8.4*     PT/INR:   Recent Labs     03/11/22  1803   PROTIME 10.8   INR 1.0     APTT:  Recent Labs     03/11/22  1803 03/12/22  0529   APTT 22.2* 51.7*     FASTING LIPID PANEL:  Lab Results   Component Value Date    CHOL 234 01/05/2022    HDL 70 01/05/2022    LDLCALC 147 01/05/2022    TRIG 87 01/05/2022     LIVER PROFILE:  Recent Labs     03/11/22  1625 03/12/22  0529   * 106*   * 137*   LABALBU 3.8 3.2*     Component Ref Range & Units 03/11/22 1625   Troponin, High Sensitivity 0 - 9 ng/L 352 High          Ref Range & Units 03/12/22 0529 03/12/22 0110    Troponin, High Sensitivity 0 - 9 ng/L 227 High   227 High  CM      Ref Range & Units 03/11/22 1625   Pro-BNP 0 - 450 pg/mL 13,518 High        03/11/22 1600    Color, UA Straw/Yellow Yellow    Clarity, UA Clear Clear    Glucose, Ur Negative mg/dL Negative    Bilirubin Urine Negative Negative    Ketones, Urine Negative mg/dL 15 Abnormal     Specific Gravity, UA 1.005 - 1.030 1.015    Blood, Urine Negative SMALL Abnormal     pH, UA 5.0 - 9.0 6.0    Protein, UA Negative mg/dL Negative    Urobilinogen, Urine <2.0 E.U./dL 0.2    Nitrite, Urine Negative Negative    Leukocyte Esterase, Urine Negative Negative    WBC, UA 0 - 5 /HPF 0-1    RBC, UA 0 - 2 /HPF 5-10 Abnormal     Bacteria, UA None Seen /HPF FEW Abnormal      URINE CULTURE PENDING          Assessment and plan to follow as per Dr. Sheri Jung. NOTE: This report was transcribed using voice recognition software. Every effort was made to ensure accuracy; however, inadvertent computerized transcription errors may be present. Cade Lombardo, [address] [place] [address], [place] [address] Cardiology    Electronically signed by Kristen Holm PA-C on 3/12/2022 at 7:17 AM       PHYSICIAN ADDENDUM:  I independently reviewed the above documentation and made amendments as needed. I formulated the assessment and plan with the CLAUDIA with my contribution being >50%. Plan discussed with the patient/family/care team.     ASSESSMENT:  1. Acute heart failure with reduced ejection fraction:  1. TTE with LVEF 20 to 30% visually, ESD 4.4, normal RV size and grossly normal function, moderate to severe MR with severe MAC, mild to moderate AR, critical AS, moderate TR with RVSP ~63 with normal estimated RA pressure. Small pericardial effusion  2. Acute myocardial injury in the setting of acute heart failure  3. Hypertension  4. Right greater than left pleural effusion  5. Left bundle branch block  6. Fairly active prior to presentation        RECOMMENDATIONS:  · I had a lengthy discussion with the patient and her  at the bedside.   She is very well-preserved for her age and quite functional and the fact that she is tolerating critical aortic stenosis with severely diminished LVEF and is still ambulatory and on room air suggests to me that the aortic stenosis is the main pathology and that she would improve significantly both subjectively and in her LVEF once the aortic stenosis is treated. Given her advancing age and severe LV dysfunction, severe pulmonary hypertension, she is most likely served better with TAVR over SAVR unless she has left main or proximal LAD disease which is not amenable to PCI. We went over the risks and benefits of TAVR, SAVR, conservative management and they are certainly in favor of TAVR at this point. · We will start with TAVR protocol CTAs tomorrow  · NEPTALI and diagnostic coronary angiogram on Tuesday  · Discontinue IV heparin as there is no ACS  · Discontinue hydrochlorothiazide  · Her RA pressure based on the TTE is normal.  Will discontinue IV diuresis and switch over to torsemide 10 mg p.o. daily and adjust the dose based on response  · Lisinopril 10 mg daily  · Metoprolol succinate 25 mg daily  · We will initiate a statin once her transaminitis resolves (I suspect this is related to liver congestion secondary to heart failure)  · Discussed briefly with Dr. Buster Fleischer from cardiothoracic surgery who will see the patient. After obtaining all diagnostic data we will discuss her at the heart team meeting this coming Wednesday.   Expedited TAVR is mandatory either inpatient or outpatient within the next 2 weeks     Ever Hoyos MD, [facility] [place]  Interventional Cardiology/Structural Heart Disease  Office: [phone]  Coordinator: Adonna Schaumann

## 2022-03-13 ENCOUNTER — APPOINTMENT (OUTPATIENT)
Dept: ULTRASOUND IMAGING | Age: 77
DRG: 280 | End: 2022-03-13
Payer: MEDICARE

## 2022-03-13 ENCOUNTER — APPOINTMENT (OUTPATIENT)
Dept: CT IMAGING | Age: 77
DRG: 280 | End: 2022-03-13
Payer: MEDICARE

## 2022-03-13 LAB
ANION GAP SERPL CALCULATED.3IONS-SCNC: 14 MMOL/L (ref 7–16)
APTT: 27.1 SEC (ref 24.5–35.1)
BILIRUBIN URINE: NEGATIVE
BLOOD, URINE: NEGATIVE
BUN BLDV-MCNC: 17 MG/DL (ref 6–23)
CALCIUM SERPL-MCNC: 8.7 MG/DL (ref 8.6–10.2)
CHLORIDE BLD-SCNC: 104 MMOL/L (ref 98–107)
CLARITY: CLEAR
CO2: 22 MMOL/L (ref 22–29)
COLOR: YELLOW
CREAT SERPL-MCNC: 0.7 MG/DL (ref 0.5–1)
EKG ATRIAL RATE: 82 BPM
EKG P AXIS: 68 DEGREES
EKG P-R INTERVAL: 196 MS
EKG Q-T INTERVAL: 442 MS
EKG QRS DURATION: 162 MS
EKG QTC CALCULATION (BAZETT): 516 MS
EKG T AXIS: 175 DEGREES
EKG VENTRICULAR RATE: 82 BPM
GFR AFRICAN AMERICAN: >60
GFR NON-AFRICAN AMERICAN: >60 ML/MIN/1.73
GLUCOSE BLD-MCNC: 101 MG/DL (ref 74–99)
GLUCOSE URINE: NEGATIVE MG/DL
HBA1C MFR BLD: 5.2 % (ref 4–5.6)
HCT VFR BLD CALC: 35.8 % (ref 34–48)
HEMOGLOBIN: 11.8 G/DL (ref 11.5–15.5)
KETONES, URINE: NEGATIVE MG/DL
LEUKOCYTE ESTERASE, URINE: NEGATIVE
MAGNESIUM: 1.8 MG/DL (ref 1.6–2.6)
MCH RBC QN AUTO: 28.4 PG (ref 26–35)
MCHC RBC AUTO-ENTMCNC: 33 % (ref 32–34.5)
MCV RBC AUTO: 86.3 FL (ref 80–99.9)
NITRITE, URINE: NEGATIVE
PDW BLD-RTO: 14.6 FL (ref 11.5–15)
PH UA: 7 (ref 5–9)
PLATELET # BLD: 269 E9/L (ref 130–450)
PMV BLD AUTO: 10.2 FL (ref 7–12)
POTASSIUM SERPL-SCNC: 3.9 MMOL/L (ref 3.5–5)
PROTEIN UA: NEGATIVE MG/DL
RBC # BLD: 4.15 E12/L (ref 3.5–5.5)
SODIUM BLD-SCNC: 140 MMOL/L (ref 132–146)
SPECIFIC GRAVITY UA: <=1.005 (ref 1–1.03)
URINE CULTURE, ROUTINE: NORMAL
UROBILINOGEN, URINE: 0.2 E.U./DL
WBC # BLD: 8.4 E9/L (ref 4.5–11.5)

## 2022-03-13 PROCEDURE — 87088 URINE BACTERIA CULTURE: CPT

## 2022-03-13 PROCEDURE — 85027 COMPLETE CBC AUTOMATED: CPT

## 2022-03-13 PROCEDURE — 6370000000 HC RX 637 (ALT 250 FOR IP): Performed by: PHYSICIAN ASSISTANT

## 2022-03-13 PROCEDURE — 6370000000 HC RX 637 (ALT 250 FOR IP): Performed by: INTERNAL MEDICINE

## 2022-03-13 PROCEDURE — 81003 URINALYSIS AUTO W/O SCOPE: CPT

## 2022-03-13 PROCEDURE — 93880 EXTRACRANIAL BILAT STUDY: CPT

## 2022-03-13 PROCEDURE — 99223 1ST HOSP IP/OBS HIGH 75: CPT | Performed by: THORACIC SURGERY (CARDIOTHORACIC VASCULAR SURGERY)

## 2022-03-13 PROCEDURE — 36415 COLL VENOUS BLD VENIPUNCTURE: CPT

## 2022-03-13 PROCEDURE — 85730 THROMBOPLASTIN TIME PARTIAL: CPT

## 2022-03-13 PROCEDURE — 80048 BASIC METABOLIC PNL TOTAL CA: CPT

## 2022-03-13 PROCEDURE — 6360000004 HC RX CONTRAST MEDICATION: Performed by: RADIOLOGY

## 2022-03-13 PROCEDURE — 83036 HEMOGLOBIN GLYCOSYLATED A1C: CPT

## 2022-03-13 PROCEDURE — 83735 ASSAY OF MAGNESIUM: CPT

## 2022-03-13 PROCEDURE — 2140000000 HC CCU INTERMEDIATE R&B

## 2022-03-13 PROCEDURE — 74174 CTA ABD&PLVS W/CONTRAST: CPT

## 2022-03-13 PROCEDURE — 87081 CULTURE SCREEN ONLY: CPT

## 2022-03-13 PROCEDURE — 75572 CT HRT W/3D IMAGE: CPT

## 2022-03-13 PROCEDURE — 99233 SBSQ HOSP IP/OBS HIGH 50: CPT | Performed by: INTERNAL MEDICINE

## 2022-03-13 PROCEDURE — 71275 CT ANGIOGRAPHY CHEST: CPT

## 2022-03-13 RX ORDER — ROSUVASTATIN CALCIUM 20 MG/1
20 TABLET, COATED ORAL DAILY
Status: DISCONTINUED | OUTPATIENT
Start: 2022-03-13 | End: 2022-03-16 | Stop reason: HOSPADM

## 2022-03-13 RX ADMIN — ROSUVASTATIN 20 MG: 20 TABLET, FILM COATED ORAL at 09:53

## 2022-03-13 RX ADMIN — IOPAMIDOL 100 ML: 755 INJECTION, SOLUTION INTRAVENOUS at 07:53

## 2022-03-13 RX ADMIN — POTASSIUM CHLORIDE 20 MEQ: 20 TABLET, EXTENDED RELEASE ORAL at 09:53

## 2022-03-13 RX ADMIN — ASPIRIN 81 MG: 81 TABLET, COATED ORAL at 09:52

## 2022-03-13 RX ADMIN — TORSEMIDE 10 MG: 20 TABLET ORAL at 09:53

## 2022-03-13 RX ADMIN — LISINOPRIL 10 MG: 10 TABLET ORAL at 09:52

## 2022-03-13 RX ADMIN — METOPROLOL SUCCINATE 25 MG: 25 TABLET, EXTENDED RELEASE ORAL at 09:52

## 2022-03-13 ASSESSMENT — PAIN SCALES - GENERAL
PAINLEVEL_OUTOF10: 0

## 2022-03-13 NOTE — PROGRESS NOTES
Structural heart progress Note:    Narrative:  · Net -1 L past 24 hours. CBC and BMP stable  · Systolics in the 334G 251Y  · TAVR CTAs were done this morning. Interpretation below      Objective:  /76   Pulse 88   Temp 97.6 °F (36.4 °C) (Temporal)   Resp 16   Ht 4' 9\" (1.448 m)   Wt 102 lb 3.2 oz (46.4 kg)   SpO2 93%   BMI 22.12 kg/m²    General: NAD  Lungs: CTAB  Heart: RRR.  3/6 late peaking systolic ejection murmur over the upper right sternal border with an inaudible S2  Abdomen: soft, NT/ND  Extremities: Trace pitting edema. Warm  Neuro: A&Ox3, MAEx4      Recent Labs     03/13/22  0604 03/12/22  0529 03/11/22  1625   WBC 8.4 8.9 11.7*   HGB 11.8 11.7 14.3   HCT 35.8 35.2 43.5   MCV 86.3 85.6 87.5    282 346       Lab Results   Component Value Date     03/13/2022    K 3.9 03/13/2022    K 4.1 03/11/2022     03/13/2022    CO2 22 03/13/2022    BUN 17 03/13/2022    CREATININE 0.7 03/13/2022    GLUCOSE 101 03/13/2022    CALCIUM 8.7 03/13/2022        TAVR CTAs personally reviewed:  Valve morphology: Trileaflet valve with severe leaflet calcification and protruding calcium during the SCJ. No significant LVOT calcification. Annular area: 407  Annular perimeter: 72  Annular dimensions: 20.5x26.5, area-derived diameter 22.8  LVOT dimensions: area 446, perimeter 77, 20.4x29.0 at -4mm  Sinus of Valsalva dimensions: L31.2, R28.1, N27.5  STJ height: 19.6  STJ dimensions: 19.7x26.9 (eccentric protruding calcium nodule)  Left coronary height: 10.7  Right coronary height: 17  Annular angle to the horizontal plane: 32  Implant angle: LAO15/CAU3    Right iliofemoral: Adequate  Left iliofemoral: Adequate    Aortic arch: severe brachiocephalic tortuosity    Conclusions: Anatomy is amenable to TAVR with a 23 mm EUSEBIO 3 Ultra via percutaneous right femoral access under MAC and TTE with caution regarding the STJ calcium      Assessment:  1.  Acute heart failure with reduced ejection fraction:  1. TTE with LVEF 20 to 30% visually, ESD 4.4, normal RV size and grossly normal function, moderate to severe MR with severe MAC, mild to moderate AR, critical AS, moderate TR with RVSP ~63 with normal estimated RA pressure. Small pericardial effusion  2. Acute myocardial injury in the setting of acute heart failure  3. Hypertension  4. Right greater than left pleural effusion  5. Left bundle branch block  6. Fairly active prior to presentation        RECOMMENDATIONS:  · I had a lengthy discussion with the patient and her  at the bedside. She is very well-preserved for her age and quite functional and the fact that she is tolerating critical aortic stenosis with severely diminished LVEF and is still ambulatory and on room air suggests to me that the aortic stenosis is the main pathology and that she would improve significantly both subjectively and in her LVEF once the aortic stenosis is treated. Given her advancing age and severe LV dysfunction, severe pulmonary hypertension, she is most likely served better with TAVR over SAVR unless she has left main or proximal LAD disease not amenable to PCI. We went over the risks and benefits of TAVR, SAVR, conservative management and they are certainly in favor of TAVR at this point. · NEPTALI on Monday and diagnostic coronary angiogram on Tuesday  · Her RA pressure based on the TTE is normal.    Continue torsemide 10 mg p.o. daily and adjust the dose based on response  · Continue lisinopril 10 mg daily and metoprolol succinate 25 mg daily  · Start rosuvastatin 20 mg daily  · Discussed with Dr. Corine Mason from cardiothoracic surgery who saw the patient. After obtaining all diagnostic data we will discuss her at the heart team meeting this coming Wednesday. Expedited TAVR is mandatory either inpatient or outpatient within the next 2 weeks  ·         We will follow.       Torie Fink MD, [facility] [place]  Interventional Cardiology/Structural Heart Disease  Office: [phone]

## 2022-03-13 NOTE — PATIENT CARE CONFERENCE
[facility] Quality Flow/Interdisciplinary Rounds Progress Note        Quality Flow Rounds held on March 13, 2022    Disciplines Attending:  Bedside Nurse, ,  and Nursing Unit Leadership    Zenobia Valenzuela was admitted on 3/11/2022  3:53 PM    Anticipated Discharge Date:  Expected Discharge Date: 03/14/22    Disposition:    Tomi Score:  Tomi Scale Score: 22    Readmission Risk              Risk of Unplanned Readmission:  7           Discussed patient goal for the day, patient clinical progression, and barriers to discharge.   The following Goal(s) of the Day/Commitment(s) have been identified:  Diagnostics - Report Results      Eduar Farmer RN  March 13, 2022

## 2022-03-13 NOTE — CONSULTS
CTS Consult    Patient name: John Daniel    Reason for consult: Critical AS    Referring Physician: Dr. Emi Solo    Primary Care Physician: Lindsay Mcgrath DO    Date of service: 3/13/2022    Chief Complaint: SOB    HPI: 67year old female without previous cardiac issues presented with SOB. She was found to have a NSTEMI and and echo shows critical AS with multi valvular disease. She just got back from TAVR scans. Currently she denies CP, SOB, LUIS, LE edema, N/V, F/C, orthopnea, PND and syncope.     Allergies: No Known Allergies    Home medications:    Current Facility-Administered Medications   Medication Dose Route Frequency Provider Last Rate Last Admin    aspirin EC tablet 81 mg  81 mg Oral Daily Kendall Alicia MD   81 mg at 03/12/22 0930    perflutren lipid microspheres (DEFINITY) injection 1.65 mg  1.5 mL IntraVENous ONCE PRN Rosaline Miner MD        metoprolol succinate (TOPROL XL) extended release tablet 25 mg  25 mg Oral Daily [facility] RASHAD French   25 mg at 03/12/22 1219    lisinopril (PRINIVIL;ZESTRIL) tablet 10 mg  10 mg Oral Daily Rosaline Miner MD        potassium chloride (KLOR-CON M) extended release tablet 20 mEq  20 mEq Oral Daily with breakfast Kendall Alicia MD        torsemide (DEMADEX) tablet 10 mg  10 mg Oral Daily Rosaline Miner MD           Past Medical History:  Past Medical History:   Diagnosis Date    Hypertension        Past Surgical History:  Past Surgical History:   Procedure Laterality Date    HERNIA REPAIR      HYSTERECTOMY, TOTAL ABDOMINAL      JOINT REPLACEMENT  03/12/2019    left knee       Social History:  Social History     Socioeconomic History    Marital status:      Spouse name: Not on file    Number of children: Not on file    Years of education: Not on file    Highest education level: Not on file   Occupational History    Occupation: CNA   Tobacco Use    Smoking status: Never Smoker    Smokeless tobacco: Never Used   Substance and Sexual Activity    Alcohol use: Not Currently    Drug use: Never    Sexual activity: Not on file   Other Topics Concern    Not on file   Social History Narrative    Not on file     Social Determinants of Health     Financial Resource Strain: Low Risk     Difficulty of Paying Living Expenses: Not hard at all   Food Insecurity: No Food Insecurity    Worried About Running Out of Food in the Last Year: Never true    [address] in the Last Year: Never true   Transportation Needs:     Lack of Transportation (Medical): Not on file    Lack of Transportation (Non-Medical): Not on file   Physical Activity:     Days of Exercise per Week: Not on file    Minutes of Exercise per Session: Not on file   Stress:     Feeling of Stress : Not on file   Social Connections:     Frequency of Communication with Friends and Family: Not on file    Frequency of Social Gatherings with Friends and Family: Not on file    Attends Evangelical Services: Not on file    Active Member of [address] [place] [address] or Organizations: Not on file    Attends Club or Organization Meetings: Not on file    Marital Status: Not on file   Intimate Partner Violence:     Fear of Current or Ex-Partner: Not on file    Emotionally Abused: Not on file    Physically Abused: Not on file    Sexually Abused: Not on file   Housing Stability:     Unable to Pay for Housing in the Last Year: Not on file    Number of [place] in the Last Year: Not on file    Unstable Housing in the Last Year: Not on file       Family History:  Family History   Problem Relation Age of Onset    Stroke Mother     Heart Attack Father        Review of Systems:  Constitutional: Denies fevers, chills, or weight loss. HEENT: Denies visual changes or hearing loss. Heart: As per HPI. Lungs: Denies shortness of breath, cough, or wheezing. Gastrointestinal: Denies nausea, vomiting, constipation, or diarrhea. Genitourinary: dysuria or hematuria.   Psychiatric: Patient denies anxiety or depression. Neurologic: Patient denies weakness of the extremities, dizziness, or headaches. All other ROS checked and found to be negative. Labs:  Recent Labs     03/11/22  1625 03/12/22  0529 03/13/22  0604   WBC 11.7* 8.9 8.4   HGB 14.3 11.7 11.8   HCT 43.5 35.2 35.8    282 269      Recent Labs     03/11/22  1625 03/12/22  0529 03/13/22  0604   BUN 18 15 17   CREATININE 0.7 0.5 0.7       Objective:  Vitals /76   Pulse 88   Temp 97.6 °F (36.4 °C) (Temporal)   Resp 16   Ht 4' 9\" (1.448 m)   Wt 102 lb 3.2 oz (46.4 kg)   SpO2 93%   BMI 22.12 kg/m²   General Appearance: Pleasant 67y.o. year old female who appears stated age. Communicates well, no acute distress. HEENT: Head is normocephalic, atraumatic. EOMs intact, PERRL. Trachea midline. Lungs: Normal respiratory rate and normal effort. She is not in respiratory distress. Breath sounds clear to auscultation. No wheezes. Heart: Normal rate. Regular rhythm. S1 normal and S2 normal. Positive for murmur. Chest: Symmetric chest wall expansion. Extremities: Normal range of motion. Neurological: Patient is alert and oriented to person, place and time. Patient has normal reflexes. Skin: Warm and dry. Abdomen: Abdomen is soft and non-distended. Bowel sounds are normal. There is no abdominal tenderness tenderness. There is no guarding. There is no mass. Pulses: Distal pulses are intact. Skin: Warm and dry without lesions. Assessment: Critical AS        Plan: NEPTALI and heart cath tomorrow. Heart team discussion but certainly at her age and with her significant frailty she is best served percutaneously. Will ask dental to see and do other testing as able. Thank you!   Tamra Quiroga MD    Electronically signed by Tamra Quiroga MD on 3/13/2022 at 8:50 AM

## 2022-03-13 NOTE — PROGRESS NOTES
Subjective:    Chief complaint:    Feeling about the same  Breathing is stable  Objective:    BP (!) 109/59   Pulse 61   Temp 98 °F (36.7 °C) (Temporal)   Resp 18   Ht 4' 9\" (1.448 m)   Wt 102 lb 3.2 oz (46.4 kg)   SpO2 96%   BMI 22.12 kg/m²   General : Awake ,alert,no distress. Heart:  RRR, no murmurs, gallops, or rubs. Lungs:  CTA bilaterally, no wheeze, rales or rhonchi  Abd: bowel sounds present, nontender, nondistended, no masses  Extrem:  No clubbing, cyanosis, or edema    CBC:   Lab Results   Component Value Date    WBC 8.4 03/13/2022    RBC 4.15 03/13/2022    HGB 11.8 03/13/2022    HCT 35.8 03/13/2022    MCV 86.3 03/13/2022    MCH 28.4 03/13/2022    MCHC 33.0 03/13/2022    RDW 14.6 03/13/2022     03/13/2022    MPV 10.2 03/13/2022     BMP:    Lab Results   Component Value Date     03/13/2022    K 3.9 03/13/2022    K 4.1 03/11/2022     03/13/2022    CO2 22 03/13/2022    BUN 17 03/13/2022    LABALBU 3.2 03/12/2022    CREATININE 0.7 03/13/2022    CALCIUM 8.7 03/13/2022    GFRAA >60 03/13/2022    LABGLOM >60 03/13/2022    GLUCOSE 101 03/13/2022     PT/INR:    Lab Results   Component Value Date    PROTIME 10.8 03/11/2022    INR 1.0 03/11/2022     Troponin:  No results found for: TROPONINI    Recent Labs     03/12/22  0053   LABURIN 10 to 100,000 CFU/mL  Mixed suzanna isolated. Further workup and sensitivity testing  is not routinely indicated and will not be performed. Mixed suzanna isolated includes:  Mixed gram positive organisms  Alpha hemolytic Strep species  Nonhemolytic Strep species  Staph species       No results for input(s): BC in the last 72 hours. No results for input(s): Desmond Meiers in the last 72 hours.       Current Facility-Administered Medications:     rosuvastatin (CRESTOR) tablet 20 mg, 20 mg, Oral, Daily, Suzanne Jhaveri MD, 20 mg at 03/13/22 0953    aspirin EC tablet 81 mg, 81 mg, Oral, Daily, Jalyn Cho MD, 81 mg at 03/13/22 0952    perflutren lipid microspheres (DEFINITY) injection 1.65 mg, 1.5 mL, IntraVENous, ONCE PRN, Peter Valdivia MD    metoprolol succinate (TOPROL XL) extended release tablet 25 mg, 25 mg, Oral, Daily, Gabe French PA-C, 25 mg at 03/13/22 9994    lisinopril (PRINIVIL;ZESTRIL) tablet 10 mg, 10 mg, Oral, Daily, 10 mg at 03/13/22 3283 **AND** [DISCONTINUED] hydroCHLOROthiazide (HYDRODIURIL) tablet 12.5 mg, 12.5 mg, Oral, Daily, Kasey Sanchez MD, 12.5 mg at 03/12/22 0930    potassium chloride (KLOR-CON M) extended release tablet 20 mEq, 20 mEq, Oral, Daily with breakfast, Kasey Sanchez MD, 20 mEq at 03/13/22 0953    torsemide (DEMADEX) tablet 10 mg, 10 mg, Oral, Daily, Peter Valdivia MD, 10 mg at 03/13/22 7054    ADULT DIET; Regular; Low Fat/Low Chol/High Fiber/2 gm Na    CTA TRANSCATHETER AORTIC VALVE REPLACEMENT   Preliminary Result   Four-chamber cardiomegaly with trace to small pericardial effusion and   valvular calcifications of both the mitral valve and tri-leaflet aortic   valve. No aneurysmal dilatation of the supravalvular ascending aorta however. Coronary calcifications are noted. Pulmonary edema pattern with decompensation as there is moderate right and   small left pleural effusions present. Atherosclerotic nonaneurysmal patent abdominal aorta and iliofemoral vessels. RECOMMENDATIONS:   Unavailable         CTA CHEST W CONTRAST   Preliminary Result   Four-chamber cardiomegaly with trace to small pericardial effusion and   valvular calcifications of both the mitral valve and tri-leaflet aortic   valve. No aneurysmal dilatation of the supravalvular ascending aorta however. Coronary calcifications are noted. Pulmonary edema pattern with decompensation as there is moderate right and   small left pleural effusions present. Atherosclerotic nonaneurysmal patent abdominal aorta and iliofemoral vessels.       RECOMMENDATIONS:   Unavailable         CTA ABDOMEN PELVIS W CONTRAST Preliminary Result   Four-chamber cardiomegaly with trace to small pericardial effusion and   valvular calcifications of both the mitral valve and tri-leaflet aortic   valve. No aneurysmal dilatation of the supravalvular ascending aorta however. Coronary calcifications are noted. Pulmonary edema pattern with decompensation as there is moderate right and   small left pleural effusions present. Atherosclerotic nonaneurysmal patent abdominal aorta and iliofemoral vessels. RECOMMENDATIONS:   Unavailable         CTA PULMONARY W CONTRAST   Final Result   No evidence of pulmonary embolism. Cardiomegaly with pericardial effusion. Pleural effusions right greater than left. Nonspecific patchy focus of ground-glass appearance right upper lobe. XR CHEST (2 VW)   Final Result   CHF. Probable platelike atelectasis in the left lower lung field. Rule   focal lung collapse versus pneumonia in the medial right lower lung field. US CAROTID ARTERY BILATERAL    (Results Pending)   VL DOLORES BILATERAL LIMITED 1-2 LEVELS    (Results Pending)       Assessment:    Principal Problem:    NSTEMI (non-ST elevated myocardial infarction) ([address]  Resolved Problems:    * No resolved hospital problems. *  Transaminitis from hepatic passive congestion  Severe aortic stenosis      Plan: For NEPTALI and heart cath  CT surgery following        Kasey Sanchez MD  12:23 PM  3/13/2022    NOTE: This report was transcribed using voice recognition software.  Every effort was made to ensure accuracy; however, inadvertent transcription errors may be present

## 2022-03-14 LAB
ALBUMIN SERPL-MCNC: 2.9 G/DL (ref 3.5–5.2)
ALP BLD-CCNC: 51 U/L (ref 35–104)
ALT SERPL-CCNC: 115 U/L (ref 0–32)
ANION GAP SERPL CALCULATED.3IONS-SCNC: 9 MMOL/L (ref 7–16)
AST SERPL-CCNC: 79 U/L (ref 0–31)
BILIRUB SERPL-MCNC: 0.5 MG/DL (ref 0–1.2)
BUN BLDV-MCNC: 15 MG/DL (ref 6–23)
CALCIUM SERPL-MCNC: 8.5 MG/DL (ref 8.6–10.2)
CHLORIDE BLD-SCNC: 103 MMOL/L (ref 98–107)
CO2: 27 MMOL/L (ref 22–29)
CREAT SERPL-MCNC: 0.7 MG/DL (ref 0.5–1)
GFR AFRICAN AMERICAN: >60
GFR NON-AFRICAN AMERICAN: >60 ML/MIN/1.73
GLUCOSE BLD-MCNC: 89 MG/DL (ref 74–99)
HCT VFR BLD CALC: 35.1 % (ref 34–48)
HEMOGLOBIN: 11.6 G/DL (ref 11.5–15.5)
MAGNESIUM: 1.8 MG/DL (ref 1.6–2.6)
MCH RBC QN AUTO: 28.4 PG (ref 26–35)
MCHC RBC AUTO-ENTMCNC: 33 % (ref 32–34.5)
MCV RBC AUTO: 85.8 FL (ref 80–99.9)
MRSA CULTURE ONLY: NORMAL
PDW BLD-RTO: 14.5 FL (ref 11.5–15)
PLATELET # BLD: 253 E9/L (ref 130–450)
PMV BLD AUTO: 10.4 FL (ref 7–12)
POTASSIUM SERPL-SCNC: 3.6 MMOL/L (ref 3.5–5)
RBC # BLD: 4.09 E12/L (ref 3.5–5.5)
SODIUM BLD-SCNC: 139 MMOL/L (ref 132–146)
TOTAL PROTEIN: 4.9 G/DL (ref 6.4–8.3)
WBC # BLD: 7.4 E9/L (ref 4.5–11.5)

## 2022-03-14 PROCEDURE — 85027 COMPLETE CBC AUTOMATED: CPT

## 2022-03-14 PROCEDURE — 36415 COLL VENOUS BLD VENIPUNCTURE: CPT

## 2022-03-14 PROCEDURE — 94729 DIFFUSING CAPACITY: CPT

## 2022-03-14 PROCEDURE — 6370000000 HC RX 637 (ALT 250 FOR IP): Performed by: INTERNAL MEDICINE

## 2022-03-14 PROCEDURE — 6360000002 HC RX W HCPCS: Performed by: INTERNAL MEDICINE

## 2022-03-14 PROCEDURE — 80053 COMPREHEN METABOLIC PANEL: CPT

## 2022-03-14 PROCEDURE — 2140000000 HC CCU INTERMEDIATE R&B

## 2022-03-14 PROCEDURE — 6370000000 HC RX 637 (ALT 250 FOR IP): Performed by: PHYSICIAN ASSISTANT

## 2022-03-14 PROCEDURE — 94375 RESPIRATORY FLOW VOLUME LOOP: CPT

## 2022-03-14 PROCEDURE — 99232 SBSQ HOSP IP/OBS MODERATE 35: CPT | Performed by: INTERNAL MEDICINE

## 2022-03-14 PROCEDURE — 83735 ASSAY OF MAGNESIUM: CPT

## 2022-03-14 RX ORDER — POTASSIUM CHLORIDE 20 MEQ/1
20 TABLET, EXTENDED RELEASE ORAL ONCE
Status: COMPLETED | OUTPATIENT
Start: 2022-03-14 | End: 2022-03-14

## 2022-03-14 RX ORDER — MAGNESIUM SULFATE IN WATER 40 MG/ML
2000 INJECTION, SOLUTION INTRAVENOUS ONCE
Status: COMPLETED | OUTPATIENT
Start: 2022-03-14 | End: 2022-03-14

## 2022-03-14 RX ADMIN — METOPROLOL SUCCINATE 25 MG: 25 TABLET, EXTENDED RELEASE ORAL at 07:49

## 2022-03-14 RX ADMIN — POTASSIUM CHLORIDE 20 MEQ: 20 TABLET, EXTENDED RELEASE ORAL at 08:12

## 2022-03-14 RX ADMIN — TORSEMIDE 10 MG: 20 TABLET ORAL at 07:50

## 2022-03-14 RX ADMIN — POTASSIUM CHLORIDE 20 MEQ: 20 TABLET, EXTENDED RELEASE ORAL at 07:49

## 2022-03-14 RX ADMIN — LISINOPRIL 10 MG: 10 TABLET ORAL at 11:07

## 2022-03-14 RX ADMIN — ROSUVASTATIN 20 MG: 20 TABLET, FILM COATED ORAL at 07:50

## 2022-03-14 RX ADMIN — MAGNESIUM SULFATE HEPTAHYDRATE 2000 MG: 40 INJECTION, SOLUTION INTRAVENOUS at 18:54

## 2022-03-14 RX ADMIN — ASPIRIN 81 MG: 81 TABLET, COATED ORAL at 07:49

## 2022-03-14 ASSESSMENT — PAIN SCALES - GENERAL
PAINLEVEL_OUTOF10: 0

## 2022-03-14 NOTE — CARE COORDINATION
3/14/22 Transition of Care: patient admitted due to shortness of breath. She is alert and oriented. She resides with her spouse. She follows with Dr Brit Tao and her pharmacy is [facility]. She is scheduled for a NEPTALI. She is being worked up for a possible TAVR in the future. She will require dental work before the TAVR is scheduled. She will discharge to home and follow up as an outpatient for her preop TAVR needs.  Electronically signed by Mason Miranda RN CM on 3/14/2022 at 2:36 PM

## 2022-03-14 NOTE — PROGRESS NOTES
Hospital Medicine    Subjective:  Pt alert conversive denies sob      Current Facility-Administered Medications:     rosuvastatin (CRESTOR) tablet 20 mg, 20 mg, Oral, Daily, Lawson Rich MD, 20 mg at 03/13/22 0953    aspirin EC tablet 81 mg, 81 mg, Oral, Daily, Elsy Dan MD, 81 mg at 03/13/22 2588    perflutren lipid microspheres (DEFINITY) injection 1.65 mg, 1.5 mL, IntraVENous, ONCE PRN, Lawson Rich MD    metoprolol succinate (TOPROL XL) extended release tablet 25 mg, 25 mg, Oral, Daily, Gabe French PA-C, 25 mg at 03/13/22 5603    lisinopril (PRINIVIL;ZESTRIL) tablet 10 mg, 10 mg, Oral, Daily, 10 mg at 03/13/22 5913 **AND** [DISCONTINUED] hydroCHLOROthiazide (HYDRODIURIL) tablet 12.5 mg, 12.5 mg, Oral, Daily, Elsy Dan MD, 12.5 mg at 03/12/22 0930    potassium chloride (KLOR-CON M) extended release tablet 20 mEq, 20 mEq, Oral, Daily with breakfast, Elsy Dan MD, 20 mEq at 03/13/22 0953    torsemide (DEMADEX) tablet 10 mg, 10 mg, Oral, Daily, Lawson Rich MD, 10 mg at 03/13/22 0953    Objective:    /76   Pulse 79   Temp 97.6 °F (36.4 °C) (Temporal)   Resp 16   Ht 4' 9\" (1.448 m)   Wt 110 lb 10.7 oz (50.2 kg)   SpO2 100%   BMI 23.95 kg/m²     Heart:  Reg with syst murmur  Lungs:  l base crackles  Abd: + bs soft nontender  Extrem:  No edema    CBC with Differential:    Lab Results   Component Value Date    WBC 7.4 03/14/2022    RBC 4.09 03/14/2022    HGB 11.6 03/14/2022    HCT 35.1 03/14/2022     03/14/2022    MCV 85.8 03/14/2022    MCH 28.4 03/14/2022    MCHC 33.0 03/14/2022    RDW 14.5 03/14/2022    LYMPHOPCT 16.4 03/12/2022    MONOPCT 8.6 03/12/2022    BASOPCT 1.6 03/12/2022    MONOSABS 0.77 03/12/2022    LYMPHSABS 1.46 03/12/2022    EOSABS 0.11 03/12/2022    BASOSABS 0.14 03/12/2022     CMP:    Lab Results   Component Value Date     03/14/2022    K 3.6 03/14/2022    K 4.1 03/11/2022     03/14/2022    CO2 27 03/14/2022    BUN 15 03/14/2022    CREATININE 0.7 03/14/2022    GFRAA >60 03/14/2022    LABGLOM >60 03/14/2022    GLUCOSE 89 03/14/2022    PROT 4.9 03/14/2022    LABALBU 2.9 03/14/2022    CALCIUM 8.5 03/14/2022    BILITOT 0.5 03/14/2022    ALKPHOS 51 03/14/2022    AST 79 03/14/2022     03/14/2022     Warfarin PT/INR:    Lab Results   Component Value Date    INR 1.0 03/11/2022    PROTIME 10.8 03/11/2022       Assessment:    Principal Problem:    NSTEMI (non-ST elevated myocardial infarction) ([address]  Resolved Problems:    * No resolved hospital problems. *      Plan:   For tyron today        Manuela Juarez DO  6:43 AM  3/14/2022

## 2022-03-14 NOTE — PATIENT CARE CONFERENCE
[facility] Quality Flow/Interdisciplinary Rounds Progress Note        Quality Flow Rounds held on March 14, 2022    Disciplines Attending:  Bedside Nurse, ,  and Nursing Unit Leadership    Shahrzad Moreno was admitted on 3/11/2022  3:53 PM    Anticipated Discharge Date:  Expected Discharge Date: 03/14/22    Disposition:    Tomi Score:  Tomi Scale Score: 23    Readmission Risk              Risk of Unplanned Readmission:  8           Discussed patient goal for the day, patient clinical progression, and barriers to discharge.   The following Goal(s) of the Day/Commitment(s) have been identified:  Diagnostics - Report Results      David Estrada RN  March 14, 2022

## 2022-03-14 NOTE — CONSULTS
Dental History and Physical    [facility]    [address] [id number]    The patient is a 67 y.o. female     Chief Complaint: Heart Valve Replacement - Dental Clearance    History of present illness:     Pt states she has not seen a dentist in a \"Very long time\" PT admits to having poor oral hygiene. Pt states that she is currently having no pain in her oral cavity    Past Medical History:      Diagnosis Date    Hypertension        Past Surgical History:        Procedure Laterality Date    HERNIA REPAIR      HYSTERECTOMY, TOTAL ABDOMINAL      JOINT REPLACEMENT  03/12/2019    left knee       Medications Prior to Admission:    Prior to Admission medications    Medication Sig Start Date End Date Taking? Authorizing Provider   vitamin B-12 (CYANOCOBALAMIN) 100 MCG tablet Take 50 mcg by mouth daily    Historical Provider, MD   lisinopril-hydroCHLOROthiazide (PRINZIDE;ZESTORETIC) 20-12.5 MG per tablet 1 tablet daily  9/23/21   Historical Provider, MD   aspirin 81 MG EC tablet Take 81 mg by mouth daily    Historical Provider, MD       Allergies:  Patient has no known allergies. Social History:   TOBACCO:   reports that she has never smoked. She has never used smokeless tobacco.  ETOH:   reports previous alcohol use.   OCCUPATION:  NA    Family History:       Problem Relation Age of Onset    Stroke Mother     Heart Attack Father              Oral Findings:    Hygiene: mucous membranes moist, pharynx normal without lesions and dental hygiene poor    Dentition: poor    Teeth: caries: present      Tongue: midline    Floor of mouth: normal    Alveolar Process: mandibular jay, palatal torus, bony exostoses and normal    Salivary Glands: normal    Tentative Diagnosis: Pt has Stage II Grade B periodontitis     Pt has moderate calculus build up  - Gross debridement indicated    Tooth #2 = non restorable, poses infection risk , to be extracted    Resident Assessment and Plan:     1) Pt to return to dental clinic for ext #2 prior to valve surgery  2) Pt to return for SRP and Debridement post heart clearance - pt stated she will think whether to return here or a private dentist   3) Pt understands risks and benefits of procedure         Prerna Robin DDS  3/14/2022  9:56 AM    Inpatient Dental Consult on 3/14/2022 : I saw and evaluated the patient and agree with the dental resident's findings and the plan of care as documented in the dental resident's note on 3/14/2022. Please see dental resident's inpatient dental consult note for complete details.       Teaching Attending:  Nicole Michel DDS      Attending physician: Dr. Nicole Michel

## 2022-03-14 NOTE — PROGRESS NOTES
PROGRESS NOTE     CARDIOLOGY    Chief complaint: Seen today for follow up, management & recommendations for aortic stenosis, cardiomyopathy    She denies chest pain or shortness of breath today. She was sitting in bed. She was comfortable and in no distress    Wt Readings from Last 3 Encounters:   03/14/22 110 lb 10.7 oz (50.2 kg)   12/07/21 113 lb (51.3 kg)     Temp Readings from Last 3 Encounters:   03/14/22 97.4 °F (36.3 °C) (Temporal)   12/07/21 97.5 °F (36.4 °C)     BP Readings from Last 3 Encounters:   03/14/22 (!) 98/50   12/07/21 126/80     Pulse Readings from Last 3 Encounters:   03/14/22 76   12/07/21 68         Intake/Output Summary (Last 24 hours) at 3/14/2022 1844  Last data filed at 3/14/2022 1823  Gross per 24 hour   Intake 660 ml   Output 900 ml   Net -240 ml       Recent Labs     03/12/22  0529 03/13/22  0604 03/14/22  0519   WBC 8.9 8.4 7.4   HGB 11.7 11.8 11.6   HCT 35.2 35.8 35.1   MCV 85.6 86.3 85.8    269 253     Recent Labs     03/12/22  0529 03/13/22  0604 03/14/22  0519    140 139   K 3.2* 3.9 3.6    104 103   CO2 22 22 27   BUN 15 17 15   CREATININE 0.5 0.7 0.7   MG  --  1.8 1.8     Recent Labs     03/11/22  1803   PROTIME 10.8   INR 1.0     No results for input(s): CKTOTAL, CKMB, CKMBINDEX, TROPONINI in the last 72 hours. No results for input(s): BNP in the last 72 hours. No results for input(s): CHOL, HDL, TRIG in the last 72 hours.     Invalid input(s): CHOLHDLR, LDLCALCU  Recent Labs     03/12/22  0110 03/12/22  0529   TROPHS 227* 227*         rosuvastatin (CRESTOR) tablet 20 mg, Daily  aspirin EC tablet 81 mg, Daily  perflutren lipid microspheres (DEFINITY) injection 1.65 mg, ONCE PRN  metoprolol succinate (TOPROL XL) extended release tablet 25 mg, Daily  lisinopril (PRINIVIL;ZESTRIL) tablet 10 mg, Daily  potassium chloride (KLOR-CON M) extended release tablet 20 mEq, Daily with breakfast  torsemide (DEMADEX) tablet 10 mg, Daily        Review of systems:

## 2022-03-15 ENCOUNTER — ANESTHESIA (OUTPATIENT)
Dept: CARDIAC CATH/INVASIVE PROCEDURES | Age: 77
DRG: 280 | End: 2022-03-15
Payer: MEDICARE

## 2022-03-15 ENCOUNTER — ANESTHESIA EVENT (OUTPATIENT)
Dept: CARDIAC CATH/INVASIVE PROCEDURES | Age: 77
DRG: 280 | End: 2022-03-15
Payer: MEDICARE

## 2022-03-15 VITALS
DIASTOLIC BLOOD PRESSURE: 50 MMHG | OXYGEN SATURATION: 91 % | RESPIRATION RATE: 13 BRPM | SYSTOLIC BLOOD PRESSURE: 103 MMHG

## 2022-03-15 LAB
ABO/RH: NORMAL
ANTIBODY SCREEN: NORMAL
EKG ATRIAL RATE: 83 BPM
EKG P AXIS: 60 DEGREES
EKG P-R INTERVAL: 162 MS
EKG Q-T INTERVAL: 434 MS
EKG QRS DURATION: 166 MS
EKG QTC CALCULATION (BAZETT): 509 MS
EKG R AXIS: 5 DEGREES
EKG T AXIS: -179 DEGREES
EKG VENTRICULAR RATE: 83 BPM
HCT VFR BLD CALC: 35.7 % (ref 34–48)
HEMOGLOBIN: 11.7 G/DL (ref 11.5–15.5)
LV EF: 23 %
LVEF MODALITY: NORMAL
MAGNESIUM: 2.3 MG/DL (ref 1.6–2.6)
MCH RBC QN AUTO: 28.7 PG (ref 26–35)
MCHC RBC AUTO-ENTMCNC: 32.8 % (ref 32–34.5)
MCV RBC AUTO: 87.7 FL (ref 80–99.9)
PDW BLD-RTO: 14.6 FL (ref 11.5–15)
PLATELET # BLD: 258 E9/L (ref 130–450)
PMV BLD AUTO: 10.7 FL (ref 7–12)
RBC # BLD: 4.07 E12/L (ref 3.5–5.5)
URINE CULTURE, ROUTINE: NORMAL
WBC # BLD: 7.9 E9/L (ref 4.5–11.5)

## 2022-03-15 PROCEDURE — 36415 COLL VENOUS BLD VENIPUNCTURE: CPT

## 2022-03-15 PROCEDURE — 93454 CORONARY ARTERY ANGIO S&I: CPT | Performed by: INTERNAL MEDICINE

## 2022-03-15 PROCEDURE — C1894 INTRO/SHEATH, NON-LASER: HCPCS

## 2022-03-15 PROCEDURE — 2709999900 HC NON-CHARGEABLE SUPPLY

## 2022-03-15 PROCEDURE — 6360000002 HC RX W HCPCS

## 2022-03-15 PROCEDURE — 93325 DOPPLER ECHO COLOR FLOW MAPG: CPT

## 2022-03-15 PROCEDURE — B2111ZZ FLUOROSCOPY OF MULTIPLE CORONARY ARTERIES USING LOW OSMOLAR CONTRAST: ICD-10-PCS | Performed by: INTERNAL MEDICINE

## 2022-03-15 PROCEDURE — 6370000000 HC RX 637 (ALT 250 FOR IP)

## 2022-03-15 PROCEDURE — 2580000003 HC RX 258: Performed by: ANESTHESIOLOGIST ASSISTANT

## 2022-03-15 PROCEDURE — 86850 RBC ANTIBODY SCREEN: CPT

## 2022-03-15 PROCEDURE — 86901 BLOOD TYPING SEROLOGIC RH(D): CPT

## 2022-03-15 PROCEDURE — 83735 ASSAY OF MAGNESIUM: CPT

## 2022-03-15 PROCEDURE — 6360000002 HC RX W HCPCS: Performed by: ANESTHESIOLOGIST ASSISTANT

## 2022-03-15 PROCEDURE — 2500000003 HC RX 250 WO HCPCS

## 2022-03-15 PROCEDURE — 93321 DOPPLER ECHO F-UP/LMTD STD: CPT

## 2022-03-15 PROCEDURE — 2500000003 HC RX 250 WO HCPCS: Performed by: ANESTHESIOLOGIST ASSISTANT

## 2022-03-15 PROCEDURE — 6370000000 HC RX 637 (ALT 250 FOR IP): Performed by: INTERNAL MEDICINE

## 2022-03-15 PROCEDURE — 85027 COMPLETE CBC AUTOMATED: CPT

## 2022-03-15 PROCEDURE — C1887 CATHETER, GUIDING: HCPCS

## 2022-03-15 PROCEDURE — 86900 BLOOD TYPING SEROLOGIC ABO: CPT

## 2022-03-15 PROCEDURE — 4A023N7 MEASUREMENT OF CARDIAC SAMPLING AND PRESSURE, LEFT HEART, PERCUTANEOUS APPROACH: ICD-10-PCS | Performed by: INTERNAL MEDICINE

## 2022-03-15 PROCEDURE — 2140000000 HC CCU INTERMEDIATE R&B

## 2022-03-15 PROCEDURE — 6370000000 HC RX 637 (ALT 250 FOR IP): Performed by: PHYSICIAN ASSISTANT

## 2022-03-15 PROCEDURE — 93454 CORONARY ARTERY ANGIO S&I: CPT

## 2022-03-15 PROCEDURE — 93312 ECHO TRANSESOPHAGEAL: CPT

## 2022-03-15 PROCEDURE — C1769 GUIDE WIRE: HCPCS

## 2022-03-15 RX ORDER — ETOMIDATE 2 MG/ML
INJECTION INTRAVENOUS PRN
Status: DISCONTINUED | OUTPATIENT
Start: 2022-03-15 | End: 2022-03-15 | Stop reason: SDUPTHER

## 2022-03-15 RX ORDER — ACETAMINOPHEN 325 MG/1
650 TABLET ORAL EVERY 4 HOURS PRN
Status: DISCONTINUED | OUTPATIENT
Start: 2022-03-15 | End: 2022-03-16 | Stop reason: HOSPADM

## 2022-03-15 RX ORDER — ASPIRIN 325 MG
325 TABLET ORAL ONCE
Status: COMPLETED | OUTPATIENT
Start: 2022-03-15 | End: 2022-03-15

## 2022-03-15 RX ORDER — PROPOFOL 10 MG/ML
INJECTION, EMULSION INTRAVENOUS PRN
Status: DISCONTINUED | OUTPATIENT
Start: 2022-03-15 | End: 2022-03-15 | Stop reason: SDUPTHER

## 2022-03-15 RX ORDER — HYDROCODONE BITARTRATE AND ACETAMINOPHEN 5; 325 MG/1; MG/1
1 TABLET ORAL EVERY 6 HOURS PRN
Status: DISCONTINUED | OUTPATIENT
Start: 2022-03-15 | End: 2022-03-16 | Stop reason: HOSPADM

## 2022-03-15 RX ORDER — PHENYLEPHRINE HYDROCHLORIDE 10 MG/ML
INJECTION INTRAVENOUS PRN
Status: DISCONTINUED | OUTPATIENT
Start: 2022-03-15 | End: 2022-03-15 | Stop reason: SDUPTHER

## 2022-03-15 RX ORDER — SODIUM CHLORIDE 9 MG/ML
INJECTION, SOLUTION INTRAVENOUS CONTINUOUS PRN
Status: DISCONTINUED | OUTPATIENT
Start: 2022-03-15 | End: 2022-03-15 | Stop reason: SDUPTHER

## 2022-03-15 RX ADMIN — PHENYLEPHRINE HYDROCHLORIDE 100 MCG: 10 INJECTION, SOLUTION INTRAVENOUS at 08:38

## 2022-03-15 RX ADMIN — ETOMIDATE 4 MG: 2 INJECTION, SOLUTION INTRAVENOUS at 07:53

## 2022-03-15 RX ADMIN — PROPOFOL 5 MG: 10 INJECTION, EMULSION INTRAVENOUS at 08:23

## 2022-03-15 RX ADMIN — METOPROLOL SUCCINATE 25 MG: 25 TABLET, EXTENDED RELEASE ORAL at 13:51

## 2022-03-15 RX ADMIN — ETOMIDATE 2 MG: 2 INJECTION, SOLUTION INTRAVENOUS at 08:33

## 2022-03-15 RX ADMIN — PHENYLEPHRINE HYDROCHLORIDE 100 MCG: 10 INJECTION, SOLUTION INTRAVENOUS at 08:07

## 2022-03-15 RX ADMIN — PHENYLEPHRINE HYDROCHLORIDE 100 MCG: 10 INJECTION, SOLUTION INTRAVENOUS at 07:59

## 2022-03-15 RX ADMIN — PHENYLEPHRINE HYDROCHLORIDE 50 MCG: 10 INJECTION, SOLUTION INTRAVENOUS at 07:53

## 2022-03-15 RX ADMIN — PHENYLEPHRINE HYDROCHLORIDE 100 MCG: 10 INJECTION, SOLUTION INTRAVENOUS at 08:04

## 2022-03-15 RX ADMIN — ROSUVASTATIN 20 MG: 20 TABLET, FILM COATED ORAL at 21:23

## 2022-03-15 RX ADMIN — ETOMIDATE 2 MG: 2 INJECTION, SOLUTION INTRAVENOUS at 07:59

## 2022-03-15 RX ADMIN — LISINOPRIL 10 MG: 10 TABLET ORAL at 13:52

## 2022-03-15 RX ADMIN — ETOMIDATE 2 MG: 2 INJECTION, SOLUTION INTRAVENOUS at 08:25

## 2022-03-15 RX ADMIN — PHENYLEPHRINE HYDROCHLORIDE 100 MCG: 10 INJECTION, SOLUTION INTRAVENOUS at 08:12

## 2022-03-15 RX ADMIN — ETOMIDATE 2 MG: 2 INJECTION, SOLUTION INTRAVENOUS at 08:07

## 2022-03-15 RX ADMIN — PROPOFOL 5 MG: 10 INJECTION, EMULSION INTRAVENOUS at 07:53

## 2022-03-15 RX ADMIN — HYDROCODONE BITARTRATE AND ACETAMINOPHEN 1 TABLET: 5; 325 TABLET ORAL at 13:52

## 2022-03-15 RX ADMIN — ETOMIDATE 2 MG: 2 INJECTION, SOLUTION INTRAVENOUS at 08:15

## 2022-03-15 RX ADMIN — POTASSIUM CHLORIDE 20 MEQ: 20 TABLET, EXTENDED RELEASE ORAL at 13:52

## 2022-03-15 RX ADMIN — ASPIRIN 325 MG: 325 TABLET ORAL at 06:57

## 2022-03-15 RX ADMIN — PHENYLEPHRINE HYDROCHLORIDE 50 MCG: 10 INJECTION, SOLUTION INTRAVENOUS at 08:35

## 2022-03-15 RX ADMIN — SODIUM CHLORIDE: 9 INJECTION, SOLUTION INTRAVENOUS at 07:45

## 2022-03-15 ASSESSMENT — PAIN SCALES - GENERAL
PAINLEVEL_OUTOF10: 6
PAINLEVEL_OUTOF10: 0

## 2022-03-15 ASSESSMENT — ENCOUNTER SYMPTOMS: SHORTNESS OF BREATH: 1

## 2022-03-15 NOTE — PROGRESS NOTES
[facility] Medicine    Subjective:  Pt seen post heart cath / NEPTALI c/o back pain      Current Facility-Administered Medications:     acetaminophen (TYLENOL) tablet 650 mg, 650 mg, Oral, Q4H PRN, Rocky Montes De Oca DO    rosuvastatin (CRESTOR) tablet 20 mg, 20 mg, Oral, Daily, Guzman Hardwick MD, 20 mg at 03/14/22 0750    aspirin EC tablet 81 mg, 81 mg, Oral, Daily, Demarcus Morse MD, 81 mg at 03/14/22 0749    perflutren lipid microspheres (DEFINITY) injection 1.65 mg, 1.5 mL, IntraVENous, ONCE PRN, Guzman Hardwick MD    metoprolol succinate (TOPROL XL) extended release tablet 25 mg, 25 mg, Oral, Daily, Franklin French PA-C, 25 mg at 03/14/22 0749    lisinopril (PRINIVIL;ZESTRIL) tablet 10 mg, 10 mg, Oral, Daily, 10 mg at 03/14/22 1107 **AND** [DISCONTINUED] hydroCHLOROthiazide (HYDRODIURIL) tablet 12.5 mg, 12.5 mg, Oral, Daily, Demarcus Morse MD, 12.5 mg at 03/12/22 0930    potassium chloride (KLOR-CON M) extended release tablet 20 mEq, 20 mEq, Oral, Daily with breakfast, Demarcus Morse MD, 20 mEq at 03/14/22 0749    torsemide (DEMADEX) tablet 10 mg, 10 mg, Oral, Daily, Guzman Hardwick MD, 10 mg at 03/14/22 0750    Objective:    BP (!) 119/58   Pulse 63   Temp 97.8 °F (36.6 °C)   Resp 16   Ht 4' 9\" (1.448 m)   Wt 110 lb 10.7 oz (50.2 kg)   SpO2 95%   BMI 23.95 kg/m²     Heart:  reg  Lungs:  ctab  Abd: + bs soft nontender  Extrem:  W/o edema    CBC with Differential:    Lab Results   Component Value Date    WBC 7.9 03/15/2022    RBC 4.07 03/15/2022    HGB 11.7 03/15/2022    HCT 35.7 03/15/2022     03/15/2022    MCV 87.7 03/15/2022    MCH 28.7 03/15/2022    MCHC 32.8 03/15/2022    RDW 14.6 03/15/2022    LYMPHOPCT 16.4 03/12/2022    MONOPCT 8.6 03/12/2022    BASOPCT 1.6 03/12/2022    MONOSABS 0.77 03/12/2022    LYMPHSABS 1.46 03/12/2022    EOSABS 0.11 03/12/2022    BASOSABS 0.14 03/12/2022     CMP:    Lab Results   Component Value Date     03/14/2022    K 3.6 03/14/2022    K 4.1 03/11/2022     03/14/2022    CO2 27 03/14/2022    BUN 15 03/14/2022    CREATININE 0.7 03/14/2022    GFRAA >60 03/14/2022    LABGLOM >60 03/14/2022    GLUCOSE 89 03/14/2022    PROT 4.9 03/14/2022    LABALBU 2.9 03/14/2022    CALCIUM 8.5 03/14/2022    BILITOT 0.5 03/14/2022    ALKPHOS 51 03/14/2022    AST 79 03/14/2022     03/14/2022     Warfarin PT/INR:    Lab Results   Component Value Date    INR 1.0 03/11/2022    PROTIME 10.8 03/11/2022       Assessment:    Principal Problem:    NSTEMI (non-ST elevated myocardial infarction) ([facility] [address]  Resolved Problems:    * No resolved hospital problems.  *      Plan:  Tooth extraction in am pain med valve procedure next week        Beth Solorio DO  1:45 PM  3/15/2022

## 2022-03-15 NOTE — PROCEDURES
Procedure:    1. Left heart cath    Physician: Elsa Perdomo DO. Assistant: none    Indication: Aortic stenosis  AUC: 7  AUC indication: 70    Complication: None. Sedation: Intravenous Versed. Anesthesia: Xylocaine, fentanyl     Sedation time: I was present for sedation and ministration at 1133 and I ended sedation at 151 for a total face-to-face sedation time of 28 minutes. Estimated blood loss: Minimal    Specimens: none    Contrast used: 50 cc    Hemodynamics:  Opening Aortic pressure: 93/44    Angiographic Results/findings:  Left Main: No angiographically significant stenosis  LAD: Mild diffuse luminal irregularities. Distal diffuse 75 to 80% stenosis. D1: Ostial 70% stenosis. 1.8 mm vessel. D2: 1.5 mm vessel. No angiographically significant stenosis. Cx: No angiographically significant stenosis. OM1: Tiny vessel. OM2: Pending vessel. Ramus: Very large vessel with multiple branches. Proximal and mid mild diffuse luminal irregularities. RCA: Dominant. Proximal diffuse 70% stenosis. PDA: No angiographically significant stenosis. PLB: No angiographically significant stenosis. Procedure:   After obtaining informed consent the patient was taken to the cardiac Cath Lab where the area over the right radial artery was prepped and draped in a sterile fashion. Using ultrasound guidance and a micropuncture technique a 6 Welsh slender rain sheath was placed in the right radial artery. This was aspirated & flushed several times throughout the procedure. This was medicated with verapamil and nitroglycerin. They were given heparin systemically. A [address] TIG catheter was advanced over a wire to the root of the aorta. It was aspirated & flushed with saline. Pressures were obtained. It was filled with contrast.  This was then manipulated into the left main coronary artery. 4 orthogonal views were obtained.   A Brandt right catheter was then manipulated into the right coronary artery and 3 orthogonal views were then obtained. The right radial artery sheath was removed and a vasc band was then placed with good patent hemostasis. They tolerated the procedure well with no complications. Note: This report was completed using computerized voice recognition software. Every effort has been made to ensure accuracy, however; and invert and computerized transcription errors may be present.

## 2022-03-15 NOTE — PLAN OF CARE
Attempted to send for patient. Patient currently in cath lab. Will try again for vascular exam when schedule permits.

## 2022-03-15 NOTE — ANESTHESIA POSTPROCEDURE EVALUATION
Department of Anesthesiology  Postprocedure Note    Patient: Matt Raymond  MRN: [id number]  YOB: 1945  Date of evaluation: 3/15/2022  Time:  2:53 PM     Procedure Summary     Date: 03/15/22 Room / Location: [facility] CATH LAB; [facility] ECHO    Anesthesia Start: 0745 Anesthesia Stop: Irina Pals    Procedure: SEY NEPTALI Diagnosis:     Scheduled Providers: Akilah Tsang MD Responsible Provider: Akilah Tsang MD    Anesthesia Type: MAC ASA Status: 4          Anesthesia Type: MAC    Heber Phase I:      Heber Phase II:      Last vitals: Reviewed and per EMR flowsheets.        Anesthesia Post Evaluation    Patient location during evaluation: PACU  Patient participation: complete - patient participated  Level of consciousness: awake and alert  Airway patency: patent  Nausea & Vomiting: no nausea and no vomiting  Complications: no  Cardiovascular status: hemodynamically stable and blood pressure returned to baseline  Respiratory status: acceptable  Hydration status: euvolemic

## 2022-03-15 NOTE — PLAN OF CARE
Structural heart follow-up note:    NEPTALI and coronary angiogram reviewed. Moderate nonobstructive CAD  Severely reduced LVEF with moderate dilation. Severe to critical aortic stenosis with heavy calcification and moderate AR. Calcific mitral valve disease with 2-3+ MR. We will discuss tomorrow at the heart team meeting then meet with the patient. .  Tentative plan for TAVR next week on March 23 second case.       Nghia Prince MD, [facility]  Interventional Cardiology & Structural Heart Disease  Office: [phone]  Fax: [phone]  Structural Heart Coordinator: Willard Montes De Oca PA-C

## 2022-03-15 NOTE — ANESTHESIA PRE PROCEDURE
Department of Anesthesiology  Preprocedure Note       Name:  Rosalva Vigil   Age:  67 y.o.  :  1945                                          MRN:  [id number]         Date:  3/15/2022      Surgeon: * No surgeons listed *    Procedure: NEPTALI W/ ANES    Medications prior to admission:   Prior to Admission medications    Medication Sig Start Date End Date Taking?  Authorizing Provider   vitamin B-12 (CYANOCOBALAMIN) 100 MCG tablet Take 50 mcg by mouth daily   Yes Historical Provider, MD   lisinopril-hydroCHLOROthiazide (PRINZIDE;ZESTORETIC) 20-12.5 MG per tablet 1 tablet daily  21  Yes Historical Provider, MD   aspirin 81 MG EC tablet Take 81 mg by mouth daily   Yes Historical Provider, MD       Current medications:    Current Facility-Administered Medications   Medication Dose Route Frequency Provider Last Rate Last Admin    rosuvastatin (CRESTOR) tablet 20 mg  20 mg Oral Daily Linh Martin MD   20 mg at 22 0750    aspirin EC tablet 81 mg  81 mg Oral Daily Raman Fernandez MD   81 mg at 22 0749    perflutren lipid microspheres (DEFINITY) injection 1.65 mg  1.5 mL IntraVENous ONCE PRN Linh Martin MD        metoprolol succinate (TOPROL XL) extended release tablet 25 mg  25 mg Oral Daily [facility] RASHAD French   25 mg at 22 0749    lisinopril (PRINIVIL;ZESTRIL) tablet 10 mg  10 mg Oral Daily Linh Martin MD   10 mg at 22 1107    potassium chloride (KLOR-CON M) extended release tablet 20 mEq  20 mEq Oral Daily with breakfast Raman Fernandez MD   20 mEq at 22 0749    torsemide (DEMADEX) tablet 10 mg  10 mg Oral Daily Linh Martin MD   10 mg at 22 0750       Allergies:  No Known Allergies    Problem List:    Patient Active Problem List   Diagnosis Code    Hypertension I10    NSTEMI (non-ST elevated myocardial infarction) ([facility] [address] I21.4       Past Medical History:        Diagnosis Date    Hypertension        Past Surgical History:        Procedure Laterality Date    HERNIA REPAIR      HYSTERECTOMY, TOTAL ABDOMINAL      JOINT REPLACEMENT  03/12/2019    left knee       Social History:    Social History     Tobacco Use    Smoking status: Never Smoker    Smokeless tobacco: Never Used   Substance Use Topics    Alcohol use: Not Currently                                Counseling given: Not Answered      Vital Signs (Current):   Vitals:    03/14/22 1422 03/14/22 2130 03/15/22 0000 03/15/22 0515   BP: (!) 98/50 (!) 104/57 107/63 118/70   Pulse: 76 84 79 82   Resp: 18 18 18 18   Temp: 36.3 °C (97.4 °F) 36.5 °C (97.7 °F) 36.4 °C (97.5 °F) 36.7 °C (98 °F)   TempSrc: Temporal Temporal Temporal Temporal   SpO2: 94% 96% 96% 95%   Weight:       Height:                                                  BP Readings from Last 3 Encounters:   03/15/22 118/70   03/15/22 115/77   12/07/21 126/80       NPO Status:                                                                                 BMI:   Wt Readings from Last 3 Encounters:   03/14/22 110 lb 10.7 oz (50.2 kg)   12/07/21 113 lb (51.3 kg)     Body mass index is 23.95 kg/m². CBC:   Lab Results   Component Value Date    WBC 7.9 03/15/2022    RBC 4.07 03/15/2022    HGB 11.7 03/15/2022    HCT 35.7 03/15/2022    MCV 87.7 03/15/2022    RDW 14.6 03/15/2022     03/15/2022       CMP:   Lab Results   Component Value Date     03/14/2022    K 3.6 03/14/2022    K 4.1 03/11/2022     03/14/2022    CO2 27 03/14/2022    BUN 15 03/14/2022    CREATININE 0.7 03/14/2022    GFRAA >60 03/14/2022    LABGLOM >60 03/14/2022    GLUCOSE 89 03/14/2022    PROT 4.9 03/14/2022    CALCIUM 8.5 03/14/2022    BILITOT 0.5 03/14/2022    ALKPHOS 51 03/14/2022    AST 79 03/14/2022     03/14/2022       POC Tests: No results for input(s): POCGLU, POCNA, POCK, POCCL, POCBUN, POCHEMO, POCHCT in the last 72 hours.     Coags:   Lab Results   Component Value Date    PROTIME 10.8 03/11/2022    INR 1.0 03/11/2022    APTT 27.1 03/13/2022       HCG (If Applicable): No results found for: PREGTESTUR, PREGSERUM, HCG, HCGQUANT     ABGs: No results found for: PHART, PO2ART, KKU7VUS, VDV5ZLK, BEART, R0TMHEQK     Type & Screen (If Applicable):  No results found for: LABABO, LABRH    Drug/Infectious Status (If Applicable):  No results found for: HIV, HEPCAB    COVID-19 Screening (If Applicable): No results found for: COVID19    EKG 3/12/2022  NSR  LBBB    ECHO 3/12/2022   Summary   Dilated left ventricle (ESD 4.4). Estimated left ventricle ejection   fraction 37 % using Biplane MOD (visually 20-30%). Normal right ventricular size. Right ventricle global systolic function is   low normal .   Suspected left to right interatrial shunt. Moderate-to-severe mitral regurgitation is present. Severe mitral annular   calcification with functional mitral stenosis. Mild-to-moderate aortic regurgitation is noted. Critical aortic stenosis. Vmax 4.6, MG 55, DI 0.12, CRUZ 0.4 (LVOTd 2.0)   Moderate tricuspid regurgitation. RVSP is ~63 mmHg suggesting severe   pulmonary hypertension. Normal Inferior Vena Cava diameter and respiratory variation. Moderate left pleural effusion. Small pericardial effusion without signs of tamponade. Anesthesia Evaluation  Patient summary reviewed and Nursing notes reviewed no history of anesthetic complications:   Airway: Mallampati: I  TM distance: >3 FB   Neck ROM: full  Mouth opening: > = 3 FB Dental:          Pulmonary:   (+) shortness of breath:  decreased breath sounds,                             Cardiovascular:  Exercise tolerance: poor (<4 METS),   (+) hypertension:, valvular problems/murmurs (CRITICAL AORTIC STENOSIS): AS, AI and MR, past MI: < 1 month, CAD:, LUIS:, pulmonary hypertension: severe,       ECG reviewed  Rhythm: regular  Rate: normal  Echocardiogram reviewed  Stress test reviewed             ROS comment: left ventricle ejection fraction 37 %  fraction 37 % using Biplane MOD (visually 20-30%).     Suspected left to right interatrial shunt. Moderate-to-severe mitral regurgitation is present. Severe mitral annular calcification with functional mitral stenosis. Mild-to-moderate aortic regurgitation is noted. Critical aortic stenosis. Vmax 4.6, MG 55, DI 0.12, CRUZ 0.4 (LVOTd 2.0)    Moderate tricuspid regurgitation. RVSP is ~63 mmHg suggesting severe pulmonary hypertension. Moderate left pleural effusion. Small pericardial effusion without signs of tamponade. Severe cardiomyopathy     Neuro/Psych:               GI/Hepatic/Renal:             Endo/Other:                     Abdominal:             Vascular: Other Findings:           Anesthesia Plan      MAC     ASA 4       Induction: intravenous. Anesthetic plan and risks discussed with patient. Plan discussed with attending.                   Marlo Alfaro 76   3/15/2022

## 2022-03-15 NOTE — CARE COORDINATION
3/15/22 Update CM note; Patient post NEPTALI/Cath. Plan is for possible TAVR on 3/23/22. Will follow for readiness to discharge home.  Electronically signed by Samantha Hawkins RN CM on 3/15/2022 at 1:19 PM

## 2022-03-16 ENCOUNTER — APPOINTMENT (OUTPATIENT)
Dept: INTERVENTIONAL RADIOLOGY/VASCULAR | Age: 77
DRG: 280 | End: 2022-03-16
Payer: MEDICARE

## 2022-03-16 VITALS
HEART RATE: 82 BPM | HEIGHT: 57 IN | OXYGEN SATURATION: 96 % | RESPIRATION RATE: 16 BRPM | WEIGHT: 102.6 LBS | SYSTOLIC BLOOD PRESSURE: 126 MMHG | BODY MASS INDEX: 22.14 KG/M2 | DIASTOLIC BLOOD PRESSURE: 75 MMHG | TEMPERATURE: 98.2 F

## 2022-03-16 LAB
ANION GAP SERPL CALCULATED.3IONS-SCNC: 11 MMOL/L (ref 7–16)
BUN BLDV-MCNC: 12 MG/DL (ref 6–23)
CALCIUM SERPL-MCNC: 9.4 MG/DL (ref 8.6–10.2)
CHLORIDE BLD-SCNC: 105 MMOL/L (ref 98–107)
CO2: 25 MMOL/L (ref 22–29)
CREAT SERPL-MCNC: 0.6 MG/DL (ref 0.5–1)
GFR AFRICAN AMERICAN: >60
GFR NON-AFRICAN AMERICAN: >60 ML/MIN/1.73
GLUCOSE BLD-MCNC: 121 MG/DL (ref 74–99)
HCT VFR BLD CALC: 37.6 % (ref 34–48)
HEMOGLOBIN: 12.4 G/DL (ref 11.5–15.5)
MAGNESIUM: 2.1 MG/DL (ref 1.6–2.6)
MCH RBC QN AUTO: 28.8 PG (ref 26–35)
MCHC RBC AUTO-ENTMCNC: 33 % (ref 32–34.5)
MCV RBC AUTO: 87.2 FL (ref 80–99.9)
PDW BLD-RTO: 14.6 FL (ref 11.5–15)
PLATELET # BLD: 300 E9/L (ref 130–450)
PMV BLD AUTO: 11 FL (ref 7–12)
POTASSIUM SERPL-SCNC: 4.5 MMOL/L (ref 3.5–5)
RBC # BLD: 4.31 E12/L (ref 3.5–5.5)
SODIUM BLD-SCNC: 141 MMOL/L (ref 132–146)
WBC # BLD: 11.2 E9/L (ref 4.5–11.5)

## 2022-03-16 PROCEDURE — 6370000000 HC RX 637 (ALT 250 FOR IP): Performed by: INTERNAL MEDICINE

## 2022-03-16 PROCEDURE — 83735 ASSAY OF MAGNESIUM: CPT

## 2022-03-16 PROCEDURE — 80048 BASIC METABOLIC PNL TOTAL CA: CPT

## 2022-03-16 PROCEDURE — 99232 SBSQ HOSP IP/OBS MODERATE 35: CPT | Performed by: INTERNAL MEDICINE

## 2022-03-16 PROCEDURE — 6370000000 HC RX 637 (ALT 250 FOR IP): Performed by: PHYSICIAN ASSISTANT

## 2022-03-16 PROCEDURE — 93922 UPR/L XTREMITY ART 2 LEVELS: CPT

## 2022-03-16 PROCEDURE — 36415 COLL VENOUS BLD VENIPUNCTURE: CPT

## 2022-03-16 PROCEDURE — 85027 COMPLETE CBC AUTOMATED: CPT

## 2022-03-16 RX ORDER — POTASSIUM CHLORIDE 20 MEQ/1
20 TABLET, EXTENDED RELEASE ORAL
Qty: 60 TABLET | Refills: 0 | Status: SHIPPED | OUTPATIENT
Start: 2022-03-16

## 2022-03-16 RX ORDER — METOPROLOL SUCCINATE 25 MG/1
25 TABLET, EXTENDED RELEASE ORAL DAILY
Qty: 30 TABLET | Refills: 0 | Status: SHIPPED | OUTPATIENT
Start: 2022-03-16

## 2022-03-16 RX ORDER — TORSEMIDE 10 MG/1
10 TABLET ORAL DAILY
Qty: 30 TABLET | Refills: 0 | Status: SHIPPED | OUTPATIENT
Start: 2022-03-16

## 2022-03-16 RX ORDER — LISINOPRIL 10 MG/1
10 TABLET ORAL DAILY
Qty: 30 TABLET | Refills: 0 | Status: SHIPPED | OUTPATIENT
Start: 2022-03-16 | End: 2022-03-29

## 2022-03-16 RX ORDER — ROSUVASTATIN CALCIUM 20 MG/1
20 TABLET, COATED ORAL DAILY
Qty: 30 TABLET | Refills: 0 | Status: SHIPPED | OUTPATIENT
Start: 2022-03-16

## 2022-03-16 RX ADMIN — ASPIRIN 81 MG: 81 TABLET, COATED ORAL at 13:15

## 2022-03-16 RX ADMIN — LISINOPRIL 10 MG: 10 TABLET ORAL at 13:16

## 2022-03-16 RX ADMIN — ROSUVASTATIN 20 MG: 20 TABLET, FILM COATED ORAL at 17:11

## 2022-03-16 RX ADMIN — TORSEMIDE 10 MG: 20 TABLET ORAL at 13:15

## 2022-03-16 RX ADMIN — POTASSIUM CHLORIDE 20 MEQ: 20 TABLET, EXTENDED RELEASE ORAL at 13:16

## 2022-03-16 RX ADMIN — METOPROLOL SUCCINATE 25 MG: 25 TABLET, EXTENDED RELEASE ORAL at 13:16

## 2022-03-16 ASSESSMENT — PAIN SCALES - GENERAL
PAINLEVEL_OUTOF10: 0

## 2022-03-16 NOTE — PROGRESS NOTES
Structural heart progress Note:    Narrative:  · Net -2.1 liters since admission. CBC and BMP stable  · Vitals stable; on room air with sats 94-98%  · Resting comfortably in bed;  at bedside. · Feeling well, ready to go home. No complaints. · Discussed plan for TAVR next Wednesday and she states understanding. Objective:  /84   Pulse 78   Temp 98.2 °F (36.8 °C) (Temporal)   Resp 16   Ht 4' 9\" (1.448 m)   Wt 102 lb 9.6 oz (46.5 kg)   SpO2 96%   BMI 22.20 kg/m²    General: NAD  Lungs: CTAB  Heart: RRR.  3/6 late peaking systolic ejection murmur over the upper right sternal border with an inaudible S2  Abdomen: soft, NT/ND  Extremities: No edema. Warm  Neuro: A&Ox3, MAEx4      Recent Labs     03/16/22  0607 03/15/22  0621 03/14/22  0519   WBC 11.2 7.9 7.4   HGB 12.4 11.7 11.6   HCT 37.6 35.7 35.1   MCV 87.2 87.7 85.8    258 253       Lab Results   Component Value Date     03/14/2022    K 3.6 03/14/2022    K 4.1 03/11/2022     03/14/2022    CO2 27 03/14/2022    BUN 15 03/14/2022    CREATININE 0.7 03/14/2022    GLUCOSE 89 03/14/2022    CALCIUM 8.5 03/14/2022        TAVR CTAs personally reviewed:  Valve morphology: Trileaflet valve with severe leaflet calcification and protruding calcium during the SCJ. No significant LVOT calcification.   Annular area: 407  Annular perimeter: 72  Annular dimensions: 20.5x26.5, area-derived diameter 22.8  LVOT dimensions: area 446, perimeter 77, 20.4x29.0 at -4mm  Sinus of Valsalva dimensions: L31.2, R28.1, N27.5  STJ height: 19.6  STJ dimensions: 19.7x26.9 (eccentric protruding calcium nodule)  Left coronary height: 10.7  Right coronary height: 17  Annular angle to the horizontal plane: 32  Implant angle: LAO15/CAU3    Right iliofemoral: Adequate  Left iliofemoral: Adequate    Aortic arch: severe brachiocephalic tortuosity    Conclusions: Anatomy is amenable to TAVR with a 23 mm EUSEBIO 3 Ultra via percutaneous right femoral access under MAC and TTE with caution regarding the STJ calcium    Coronary angiography 3/15/22:  Left Main: No angiographically significant stenosis  LAD: Mild diffuse luminal irregularities. Distal diffuse 75 to 80% stenosis. D1: Ostial 70% stenosis. 1.8 mm vessel. D2: 1.5 mm vessel. No angiographically significant stenosis. Cx: No angiographically significant stenosis. OM1: Tiny vessel. OM2: Pending vessel. Ramus: Very large vessel with multiple branches. Proximal and mid mild diffuse luminal irregularities. RCA: Dominant. Proximal diffuse 70% stenosis. PDA: No angiographically significant stenosis. PLB: No angiographically significant stenosis    NEPTALI 3/15/22:   Ejection fraction is visually estimated at 20-25%. Stretched PFO with L to R shunting by color flow Doppler. Critical aortic stenosis is present. Moderate aortic valve regurgitation. Moderate central mitral regurgitation. Mild tricuspid regurgitation. Assessment:  1. Acute heart failure with reduced ejection fraction:  1. TTE with LVEF 20 to 30% visually, ESD 4.4, normal RV size and grossly normal function, moderate to severe MR with severe MAC, mild to moderate AR, critical AS, moderate TR with RVSP ~63 with normal estimated RA pressure. Small pericardial effusion  2. Acute myocardial injury in the setting of acute heart failure. Coronary angiogram revealed a focal severe lesion in the distal LAD and otherwise moderate disease  3. Hypertension  4. Right greater than left pleural effusion  5. Left bundle branch block  6. Fairly active prior to presentation        RECOMMENDATIONS:  · I had a lengthy discussion with the patient and her  at the bedside.   She is very well-preserved for her age and quite functional and the fact that she is tolerating critical aortic stenosis with severely diminished LVEF and is still ambulatory and on room air suggests to me that the aortic stenosis is the main pathology and that she would improve significantly both subjectively and in her LVEF once the aortic stenosis is treated. Given her advancing age and severe LV dysfunction, severe pulmonary hypertension, she is most likely served better with TAVR over SAVR unless she has left main or proximal LAD disease not amenable to PCI. We went over the risks and benefits of TAVR, SAVR, conservative management and they are certainly in favor of TAVR at this point.     · Continue torsemide 10 mg p.o. daily and adjust the dose based on response  · Continue lisinopril 10 mg daily and metoprolol succinate 25 mg daily  · continue rosuvastatin 20 mg daily  · TAVR 3/23/22  · Ok for discharge from structural heart standpoint    Nghia Prince MD, [facility] [place]  Interventional Cardiology & Structural Heart Disease  Office: [phone]  Fax: [phone]  Structural Heart Coordinator: Willard Montes De Oca PA-C

## 2022-03-16 NOTE — PROGRESS NOTES
Hospital Medicine    Subjective:  Pt alert conversive for tooth extraction today      Current Facility-Administered Medications:     acetaminophen (TYLENOL) tablet 650 mg, 650 mg, Oral, Q4H PRN, Eduardo Beebe DO    HYDROcodone-acetaminophen [facility]) 5-325 MG per tablet 1 tablet, 1 tablet, Oral, Q6H PRN, Briana Jolly DO, 1 tablet at 03/15/22 1352    rosuvastatin (CRESTOR) tablet 20 mg, 20 mg, Oral, Daily, Jorge Malone MD, 20 mg at 03/15/22 2123    aspirin EC tablet 81 mg, 81 mg, Oral, Daily, Ирина Bernard MD, 81 mg at 03/14/22 0749    perflutren lipid microspheres (DEFINITY) injection 1.65 mg, 1.5 mL, IntraVENous, ONCE PRN, Jorge Malone MD    metoprolol succinate (TOPROL XL) extended release tablet 25 mg, 25 mg, Oral, Daily, Gabe French PA-C, 25 mg at 03/15/22 1351    lisinopril (PRINIVIL;ZESTRIL) tablet 10 mg, 10 mg, Oral, Daily, 10 mg at 03/15/22 1352 **AND** [DISCONTINUED] hydroCHLOROthiazide (HYDRODIURIL) tablet 12.5 mg, 12.5 mg, Oral, Daily, Ирина Bernard MD, 12.5 mg at 03/12/22 0930    potassium chloride (KLOR-CON M) extended release tablet 20 mEq, 20 mEq, Oral, Daily with breakfast, Ирина Bernard MD, 20 mEq at 03/15/22 1352    torsemide (DEMADEX) tablet 10 mg, 10 mg, Oral, Daily, Jorge Malone MD, 10 mg at 03/14/22 0750    Objective:    /84   Pulse 78   Temp 98.2 °F (36.8 °C) (Temporal)   Resp 16   Ht 4' 9\" (1.448 m)   Wt 102 lb 9.6 oz (46.5 kg)   SpO2 96%   BMI 22.20 kg/m²     Heart:  Reg  Lungs:  ctab  Abd: + bs soft nontender  Extrem:  W/o edema    CBC with Differential:    Lab Results   Component Value Date    WBC 7.9 03/15/2022    RBC 4.07 03/15/2022    HGB 11.7 03/15/2022    HCT 35.7 03/15/2022     03/15/2022    MCV 87.7 03/15/2022    MCH 28.7 03/15/2022    MCHC 32.8 03/15/2022    RDW 14.6 03/15/2022    LYMPHOPCT 16.4 03/12/2022    MONOPCT 8.6 03/12/2022    BASOPCT 1.6 03/12/2022    MONOSABS 0.77 03/12/2022    LYMPHSABS 1.46 03/12/2022 EOSABS 0.11 03/12/2022    BASOSABS 0.14 03/12/2022     CMP:    Lab Results   Component Value Date     03/14/2022    K 3.6 03/14/2022    K 4.1 03/11/2022     03/14/2022    CO2 27 03/14/2022    BUN 15 03/14/2022    CREATININE 0.7 03/14/2022    GFRAA >60 03/14/2022    LABGLOM >60 03/14/2022    GLUCOSE 89 03/14/2022    PROT 4.9 03/14/2022    LABALBU 2.9 03/14/2022    CALCIUM 8.5 03/14/2022    BILITOT 0.5 03/14/2022    ALKPHOS 51 03/14/2022    AST 79 03/14/2022     03/14/2022     Warfarin PT/INR:    Lab Results   Component Value Date    INR 1.0 03/11/2022    PROTIME 10.8 03/11/2022       Assessment:    Principal Problem:    NSTEMI (non-ST elevated myocardial infarction) ([facility] [address]  Resolved Problems:    * No resolved hospital problems.  *      Plan:  Tooth extraction dc home        [address], DO  6:49 AM  3/16/2022

## 2022-03-16 NOTE — PROGRESS NOTES
Discharged to Home. Goals met. Will come back next Wednesday for TAVR. Monitor removed and placed in nurses station.

## 2022-03-16 NOTE — PROGRESS NOTES
Patient presents to dental clinic for extraction of tooth #2 (Upper right 2nd molar) prior to heart valve surgery    Treatment options for tooth #2 1) Ext, 2) No treatment. Risks and benefits of each treatment option discussed with patient. Patient elects extraction #2. Consent obtained. Applied 20% topical benzocaine. Local anesthesia achieved with 1 cartridges of 2% lidocaine w 1:100,000 epi and 1 cartridges of 4% septocaine w 1:100,000 epi. Elevated and used appropriate forceps to extract tooth #2. All roots confirmed extracted and intact. Curetted and irrigated with sterile water. Gauze placed. Hemostasis obtained. Verbal and written post-op instructions given to patient. Please administer ibuprofen and APAP prn for post op pain.     PT IS CLEAR FROM A DENTAL STANDPOINT FOR VALVE SURGERY     Mary John DMD

## 2022-03-17 NOTE — PROGRESS NOTES
[address]   PRE-ADMISSION TESTING GENERAL INSTRUCTIONS  PAT Phone Number: [phone]      GENERAL INSTRUCTIONS:    [x] Hibiclens shower the night before and the morning of surgery. Do not use Hibiclens on your face or head. [x] Do not wear makeup, lotions, powders, deodorant. [x] Nothing by mouth after midnight; including gum, candy, mints, or water. [x] You may brush your teeth, gargle, but do NOT swallow water. [x] No tobacco products, illegal drugs, or alcohol within 24 hours of your surgery. [x] Jewelry or valuables should not be brought to the hospital. All body and/or tongue piercing's must be removed prior to arriving to hospital. ALL hair pins must be removed. [x] Arrange transportation with a responsible adult  to and from the hospital. Arrange for someone to be with you for the remainder of the day and for 24 hours after your procedure due to having had anesthesia. Who will be your  for transportation? - Zhou Cousins  [x] Bring insurance card and photo ID.  [] Transfusion Bracelet: Please bring with you to hospital, day of surgery (Not avaiable- Not a PAT apt pt)     PARKING INSTRUCTIONS:     [x] ARRIVAL TIME:  3/23 6:30am.  · [x] Enter into the [employer]. Two people may accompany you. Masks are required. · [x] Parking Lot \"I\" is where you will park. It is located on the corner of [facility] [address] [facility] and [facility]. The entrance is on [facility]. · To enter, press the button and the gate will lift. A free token will be provided to exit the lot. EDUCATION INSTRUCTIONS:        [x] Fluoroscopy-Xray used in surgery reviewed with patient. Educational pamphlet placed in chart. [x] Pain: Post-op pain is normal and to be expected. You will be asked to rate your pain from 0-10. [x] Ask your nurse for your pain medication.     MEDICATION INSTRUCTIONS:    [x] Bring a complete list of your medications, please write the last time you took the medicine, give this list to the nurse. [x] Take the following medications the morning of surgery with 1-2 ounces of water: ASA   [x] Stop herbal supplements and vitamins 5 days before your surgery. [x] Use your inhalers the morning of surgery. Bring your emergency inhaler with you day of surgery. [x] Follow physician instructions regarding any blood thinners you may be taking. WHAT TO EXPECT:    [x] The day of surgery you will be greeted and checked in by the [employer].  In addition, you will be registered in the [facility] by a Patient Access Representative. Please bring your photo ID and insurance card. A nurse will greet you in accordance to the time you are needed in the pre-op area to prepare you for surgery. Please do not be discouraged if you are not greeted in the order you arrive as there are many variables that are involved in patient preparation. Your patience is greatly appreciated as you wait for your nurse. Please bring in items such as: books, magazines, newspapers, electronics, or any other items  to occupy your time in the waiting area. [x]  Delays may occur with surgery and staff will make a sincere effort to keep you informed of delays. If any delays occur with your procedure, we apologize ahead of time for your inconvenience as we recognize the value of your time.

## 2022-03-18 NOTE — PROCEDURES
[address]                  [address] [facility]  [facility]                               PULMONARY FUNCTION    PATIENT NAME: Hernan Hall                     :        1945  MED REC NO:   [id number]                            ROOM:       06  ACCOUNT NO:   [de-identified]                           ADMIT DATE: 2022  PROVIDER:     Socorro Jaramillo DO    DATE OF PROCEDURE:  2022    The patient's height is 57 inches. The patient's weight is 110 pounds. DIAGNOSIS:  Preop. REFERRING PROVIDER:  Vicenta Dotson CNP. TOBACCO USE:  The patient reports never smoking. POST-TEST COMMENTS:  Results of this tests are questionable due to the  patient's inability to perform the maneuvers according to ATS standards. SPIROMETRY:  FVC is 0.91 which is 45% of predicted. FEV1 is 0.72 which  is 45% of predicted. FEV1/FVC ratio is 79. MVV is 25 which is 37% of  predicted. LUNG VOLUMES:  SVC is 0.81 which is 40% of predicted. DIFFUSION:  Diffusion is 3.42 which is 23% of predicted. Corrected for  alveolar ventilation is 78% of predicted. FLOW VOLUME LOOP:  The patient appears to have been significantly  coughing during flow volume loops. INTERPRETATION:  Overall interpretation of the test is difficult  secondary to the patient's inability to perform the maneuvers according  to ATS standard; however, based on the numbers available, does appear to  have severe obstruction and also moderate restriction. Please  clinically correlate.         Nilsa Spangler     D: 2022 16:37:13       T: 2022 21:56:28     [email or web address]  Job#: [id number]     Doc#: [id number]

## 2022-03-21 ENCOUNTER — PREP FOR PROCEDURE (OUTPATIENT)
Dept: CARDIOLOGY | Age: 77
End: 2022-03-21

## 2022-03-21 DIAGNOSIS — I35.0 NODULAR CALCIFIC AORTIC VALVE STENOSIS: Primary | ICD-10-CM

## 2022-03-21 RX ORDER — SODIUM CHLORIDE 9 MG/ML
25 INJECTION, SOLUTION INTRAVENOUS PRN
Status: CANCELLED | OUTPATIENT
Start: 2022-03-21

## 2022-03-21 RX ORDER — SODIUM CHLORIDE 0.9 % (FLUSH) 0.9 %
10 SYRINGE (ML) INJECTION EVERY 12 HOURS SCHEDULED
Status: CANCELLED | OUTPATIENT
Start: 2022-03-21

## 2022-03-21 RX ORDER — CHLORHEXIDINE GLUCONATE 0.12 MG/ML
15 RINSE ORAL ONCE
Status: CANCELLED | OUTPATIENT
Start: 2022-03-21 | End: 2022-03-21

## 2022-03-21 RX ORDER — SODIUM CHLORIDE 9 MG/ML
INJECTION, SOLUTION INTRAVENOUS CONTINUOUS
Status: CANCELLED | OUTPATIENT
Start: 2022-03-21

## 2022-03-21 RX ORDER — SODIUM CHLORIDE 0.9 % (FLUSH) 0.9 %
10 SYRINGE (ML) INJECTION PRN
Status: CANCELLED | OUTPATIENT
Start: 2022-03-21

## 2022-03-21 RX ORDER — CHLORHEXIDINE GLUCONATE 4 G/100ML
SOLUTION TOPICAL ONCE
Status: CANCELLED | OUTPATIENT
Start: 2022-03-21 | End: 2022-03-21

## 2022-03-22 ENCOUNTER — ANESTHESIA EVENT (OUTPATIENT)
Dept: OPERATING ROOM | Age: 77
DRG: 266 | End: 2022-03-22
Payer: MEDICARE

## 2022-03-23 ENCOUNTER — ANESTHESIA (OUTPATIENT)
Dept: OPERATING ROOM | Age: 77
DRG: 266 | End: 2022-03-23
Payer: MEDICARE

## 2022-03-23 ENCOUNTER — HOSPITAL ENCOUNTER (INPATIENT)
Age: 77
LOS: 1 days | Discharge: HOME | DRG: 266 | End: 2022-03-24
Attending: INTERNAL MEDICINE | Admitting: INTERNAL MEDICINE
Payer: MEDICARE

## 2022-03-23 VITALS — OXYGEN SATURATION: 98 % | TEMPERATURE: 95 F

## 2022-03-23 DIAGNOSIS — I35.0 NODULAR CALCIFIC AORTIC VALVE STENOSIS: ICD-10-CM

## 2022-03-23 LAB
ABO/RH: NORMAL
ANION GAP SERPL CALCULATED.3IONS-SCNC: 10 MMOL/L (ref 7–16)
ANTIBODY SCREEN: NORMAL
BUN BLDV-MCNC: 13 MG/DL (ref 6–23)
CALCIUM SERPL-MCNC: 8.3 MG/DL (ref 8.6–10.2)
CHLORIDE BLD-SCNC: 106 MMOL/L (ref 98–107)
CO2: 21 MMOL/L (ref 22–29)
CREAT SERPL-MCNC: 0.6 MG/DL (ref 0.5–1)
GFR AFRICAN AMERICAN: >60
GFR NON-AFRICAN AMERICAN: >60 ML/MIN/1.73
GLUCOSE BLD-MCNC: 117 MG/DL (ref 74–99)
HCT VFR BLD CALC: 33.8 % (ref 34–48)
HEMOGLOBIN: 10.9 G/DL (ref 11.5–15.5)
MCH RBC QN AUTO: 28 PG (ref 26–35)
MCHC RBC AUTO-ENTMCNC: 32.2 % (ref 32–34.5)
MCV RBC AUTO: 86.9 FL (ref 80–99.9)
METER GLUCOSE: 101 MG/DL (ref 74–99)
METER GLUCOSE: 126 MG/DL (ref 74–99)
PDW BLD-RTO: 14.2 FL (ref 11.5–15)
PLATELET # BLD: 236 E9/L (ref 130–450)
PMV BLD AUTO: 10.4 FL (ref 7–12)
POTASSIUM SERPL-SCNC: 3.9 MMOL/L (ref 3.5–5)
RBC # BLD: 3.89 E12/L (ref 3.5–5.5)
SODIUM BLD-SCNC: 137 MMOL/L (ref 132–146)
WBC # BLD: 9.9 E9/L (ref 4.5–11.5)

## 2022-03-23 PROCEDURE — 85027 COMPLETE CBC AUTOMATED: CPT

## 2022-03-23 PROCEDURE — 85347 COAGULATION TIME ACTIVATED: CPT

## 2022-03-23 PROCEDURE — C1713 ANCHOR/SCREW BN/BN,TIS/BN: HCPCS

## 2022-03-23 PROCEDURE — 3700000000 HC ANESTHESIA ATTENDED CARE: Performed by: INTERNAL MEDICINE

## 2022-03-23 PROCEDURE — 36415 COLL VENOUS BLD VENIPUNCTURE: CPT

## 2022-03-23 PROCEDURE — 86901 BLOOD TYPING SEROLOGIC RH(D): CPT

## 2022-03-23 PROCEDURE — C1769 GUIDE WIRE: HCPCS | Performed by: INTERNAL MEDICINE

## 2022-03-23 PROCEDURE — C1894 INTRO/SHEATH, NON-LASER: HCPCS

## 2022-03-23 PROCEDURE — 3600000008 HC SURGERY OHS BASE: Performed by: INTERNAL MEDICINE

## 2022-03-23 PROCEDURE — 6370000000 HC RX 637 (ALT 250 FOR IP): Performed by: PHYSICIAN ASSISTANT

## 2022-03-23 PROCEDURE — 2580000003 HC RX 258: Performed by: NURSE ANESTHETIST, CERTIFIED REGISTERED

## 2022-03-23 PROCEDURE — 2580000003 HC RX 258: Performed by: PHYSICIAN ASSISTANT

## 2022-03-23 PROCEDURE — 6360000002 HC RX W HCPCS: Performed by: PHYSICIAN ASSISTANT

## 2022-03-23 PROCEDURE — C1769 GUIDE WIRE: HCPCS

## 2022-03-23 PROCEDURE — 86850 RBC ANTIBODY SCREEN: CPT

## 2022-03-23 PROCEDURE — 02RF38Z REPLACEMENT OF AORTIC VALVE WITH ZOOPLASTIC TISSUE, PERCUTANEOUS APPROACH: ICD-10-PCS | Performed by: INTERNAL MEDICINE

## 2022-03-23 PROCEDURE — A4217 STERILE WATER/SALINE, 500 ML: HCPCS | Performed by: INTERNAL MEDICINE

## 2022-03-23 PROCEDURE — 3700000001 HC ADD 15 MINUTES (ANESTHESIA): Performed by: INTERNAL MEDICINE

## 2022-03-23 PROCEDURE — 80048 BASIC METABOLIC PNL TOTAL CA: CPT

## 2022-03-23 PROCEDURE — C1760 CLOSURE DEV, VASC: HCPCS

## 2022-03-23 PROCEDURE — 2580000003 HC RX 258: Performed by: INTERNAL MEDICINE

## 2022-03-23 PROCEDURE — 2500000003 HC RX 250 WO HCPCS: Performed by: NURSE ANESTHETIST, CERTIFIED REGISTERED

## 2022-03-23 PROCEDURE — 2780000006 HC MISC HEART VALVE: Performed by: INTERNAL MEDICINE

## 2022-03-23 PROCEDURE — 33361 REPLACE AORTIC VALVE PERQ: CPT | Performed by: INTERNAL MEDICINE

## 2022-03-23 PROCEDURE — 82962 GLUCOSE BLOOD TEST: CPT

## 2022-03-23 PROCEDURE — 2709999900 HC NON-CHARGEABLE SUPPLY: Performed by: INTERNAL MEDICINE

## 2022-03-23 PROCEDURE — 2709999900 HC NON-CHARGEABLE SUPPLY

## 2022-03-23 PROCEDURE — 93308 TTE F-UP OR LMTD: CPT

## 2022-03-23 PROCEDURE — 2500000003 HC RX 250 WO HCPCS: Performed by: INTERNAL MEDICINE

## 2022-03-23 PROCEDURE — 86900 BLOOD TYPING SEROLOGIC ABO: CPT

## 2022-03-23 PROCEDURE — B3101ZZ FLUOROSCOPY OF THORACIC AORTA USING LOW OSMOLAR CONTRAST: ICD-10-PCS | Performed by: INTERNAL MEDICINE

## 2022-03-23 PROCEDURE — 86923 COMPATIBILITY TEST ELECTRIC: CPT

## 2022-03-23 PROCEDURE — 2500000003 HC RX 250 WO HCPCS: Performed by: PHYSICIAN ASSISTANT

## 2022-03-23 PROCEDURE — C1894 INTRO/SHEATH, NON-LASER: HCPCS | Performed by: INTERNAL MEDICINE

## 2022-03-23 PROCEDURE — 6360000002 HC RX W HCPCS: Performed by: INTERNAL MEDICINE

## 2022-03-23 PROCEDURE — 2140000000 HC CCU INTERMEDIATE R&B

## 2022-03-23 PROCEDURE — 33361 REPLACE AORTIC VALVE PERQ: CPT | Performed by: THORACIC SURGERY (CARDIOTHORACIC VASCULAR SURGERY)

## 2022-03-23 PROCEDURE — C1760 CLOSURE DEV, VASC: HCPCS | Performed by: INTERNAL MEDICINE

## 2022-03-23 PROCEDURE — 3600000018 HC SURGERY OHS ADDTL 15MIN: Performed by: INTERNAL MEDICINE

## 2022-03-23 PROCEDURE — 6360000002 HC RX W HCPCS: Performed by: NURSE ANESTHETIST, CERTIFIED REGISTERED

## 2022-03-23 DEVICE — EDWARDS SAPIEN 3 ULTRA TRANSCATHETER HEART VALVE
Type: IMPLANTABLE DEVICE | Site: HEART | Status: FUNCTIONAL
Brand: EDWARDS SAPIEN 3 ULTRA TRANSCATHETER HEART VALVE

## 2022-03-23 DEVICE — ANGIO-SEAL VIP VASCULAR CLOSURE DEVICE
Type: IMPLANTABLE DEVICE | Status: FUNCTIONAL
Brand: ANGIO-SEAL

## 2022-03-23 RX ORDER — OXYCODONE HYDROCHLORIDE AND ACETAMINOPHEN 5; 325 MG/1; MG/1
1 TABLET ORAL EVERY 4 HOURS PRN
Status: DISCONTINUED | OUTPATIENT
Start: 2022-03-23 | End: 2022-03-24 | Stop reason: HOSPADM

## 2022-03-23 RX ORDER — SODIUM CHLORIDE 0.9 % (FLUSH) 0.9 %
10 SYRINGE (ML) INJECTION EVERY 12 HOURS SCHEDULED
Status: DISCONTINUED | OUTPATIENT
Start: 2022-03-23 | End: 2022-03-23

## 2022-03-23 RX ORDER — DEXAMETHASONE SODIUM PHOSPHATE 4 MG/ML
INJECTION, SOLUTION INTRA-ARTICULAR; INTRALESIONAL; INTRAMUSCULAR; INTRAVENOUS; SOFT TISSUE PRN
Status: DISCONTINUED | OUTPATIENT
Start: 2022-03-23 | End: 2022-03-23 | Stop reason: SDUPTHER

## 2022-03-23 RX ORDER — ONDANSETRON 2 MG/ML
4 INJECTION INTRAMUSCULAR; INTRAVENOUS EVERY 6 HOURS PRN
Status: DISCONTINUED | OUTPATIENT
Start: 2022-03-23 | End: 2022-03-24 | Stop reason: HOSPADM

## 2022-03-23 RX ORDER — SODIUM CHLORIDE 0.9 % (FLUSH) 0.9 %
10 SYRINGE (ML) INJECTION PRN
Status: DISCONTINUED | OUTPATIENT
Start: 2022-03-23 | End: 2022-03-23

## 2022-03-23 RX ORDER — SODIUM CHLORIDE 9 MG/ML
25 INJECTION, SOLUTION INTRAVENOUS PRN
Status: DISCONTINUED | OUTPATIENT
Start: 2022-03-23 | End: 2022-03-24 | Stop reason: HOSPADM

## 2022-03-23 RX ORDER — LIDOCAINE HYDROCHLORIDE 20 MG/ML
INJECTION, SOLUTION INTRAVENOUS PRN
Status: DISCONTINUED | OUTPATIENT
Start: 2022-03-23 | End: 2022-03-23 | Stop reason: SDUPTHER

## 2022-03-23 RX ORDER — NITROGLYCERIN 5 MG/ML
INJECTION, SOLUTION INTRAVENOUS PRN
Status: DISCONTINUED | OUTPATIENT
Start: 2022-03-23 | End: 2022-03-23 | Stop reason: SDUPTHER

## 2022-03-23 RX ORDER — ONDANSETRON 4 MG/1
4 TABLET, ORALLY DISINTEGRATING ORAL EVERY 8 HOURS PRN
Status: DISCONTINUED | OUTPATIENT
Start: 2022-03-23 | End: 2022-03-24 | Stop reason: HOSPADM

## 2022-03-23 RX ORDER — LISINOPRIL 10 MG/1
10 TABLET ORAL DAILY
Status: DISCONTINUED | OUTPATIENT
Start: 2022-03-23 | End: 2022-03-24 | Stop reason: HOSPADM

## 2022-03-23 RX ORDER — UBIDECARENONE 75 MG
50 CAPSULE ORAL DAILY
Status: DISCONTINUED | OUTPATIENT
Start: 2022-03-23 | End: 2022-03-24 | Stop reason: HOSPADM

## 2022-03-23 RX ORDER — DEXTROSE MONOHYDRATE 25 G/50ML
12.5 INJECTION, SOLUTION INTRAVENOUS PRN
Status: DISCONTINUED | OUTPATIENT
Start: 2022-03-23 | End: 2022-03-23

## 2022-03-23 RX ORDER — LIDOCAINE HYDROCHLORIDE 10 MG/ML
INJECTION, SOLUTION EPIDURAL; INFILTRATION; INTRACAUDAL; PERINEURAL PRN
Status: DISCONTINUED | OUTPATIENT
Start: 2022-03-23 | End: 2022-03-23 | Stop reason: HOSPADM

## 2022-03-23 RX ORDER — HYDRALAZINE HYDROCHLORIDE 20 MG/ML
INJECTION INTRAMUSCULAR; INTRAVENOUS PRN
Status: DISCONTINUED | OUTPATIENT
Start: 2022-03-23 | End: 2022-03-23 | Stop reason: SDUPTHER

## 2022-03-23 RX ORDER — CHLORHEXIDINE GLUCONATE 0.12 MG/ML
15 RINSE ORAL ONCE
Status: COMPLETED | OUTPATIENT
Start: 2022-03-23 | End: 2022-03-23

## 2022-03-23 RX ORDER — TORSEMIDE 10 MG/1
10 TABLET ORAL DAILY
Status: DISCONTINUED | OUTPATIENT
Start: 2022-03-24 | End: 2022-03-24 | Stop reason: HOSPADM

## 2022-03-23 RX ORDER — SODIUM CHLORIDE 9 MG/ML
INJECTION, SOLUTION INTRAVENOUS CONTINUOUS
Status: DISCONTINUED | OUTPATIENT
Start: 2022-03-23 | End: 2022-03-23

## 2022-03-23 RX ORDER — MIDAZOLAM HYDROCHLORIDE 1 MG/ML
INJECTION INTRAMUSCULAR; INTRAVENOUS PRN
Status: DISCONTINUED | OUTPATIENT
Start: 2022-03-23 | End: 2022-03-23 | Stop reason: SDUPTHER

## 2022-03-23 RX ORDER — TORSEMIDE 10 MG/1
10 TABLET ORAL DAILY
Status: DISCONTINUED | OUTPATIENT
Start: 2022-03-23 | End: 2022-03-23

## 2022-03-23 RX ORDER — DEXTROSE MONOHYDRATE 50 MG/ML
100 INJECTION, SOLUTION INTRAVENOUS PRN
Status: DISCONTINUED | OUTPATIENT
Start: 2022-03-23 | End: 2022-03-23

## 2022-03-23 RX ORDER — PROTAMINE SULFATE 10 MG/ML
INJECTION, SOLUTION INTRAVENOUS PRN
Status: DISCONTINUED | OUTPATIENT
Start: 2022-03-23 | End: 2022-03-23 | Stop reason: SDUPTHER

## 2022-03-23 RX ORDER — METOPROLOL SUCCINATE 50 MG/1
25 TABLET, EXTENDED RELEASE ORAL DAILY
Status: DISCONTINUED | OUTPATIENT
Start: 2022-03-23 | End: 2022-03-24 | Stop reason: HOSPADM

## 2022-03-23 RX ORDER — HEPARIN SODIUM 1000 [USP'U]/ML
INJECTION, SOLUTION INTRAVENOUS; SUBCUTANEOUS PRN
Status: DISCONTINUED | OUTPATIENT
Start: 2022-03-23 | End: 2022-03-23 | Stop reason: SDUPTHER

## 2022-03-23 RX ORDER — SODIUM CHLORIDE 9 MG/ML
INJECTION, SOLUTION INTRAVENOUS CONTINUOUS PRN
Status: DISCONTINUED | OUTPATIENT
Start: 2022-03-23 | End: 2022-03-23 | Stop reason: SDUPTHER

## 2022-03-23 RX ORDER — SODIUM CHLORIDE 0.9 % (FLUSH) 0.9 %
5-40 SYRINGE (ML) INJECTION EVERY 12 HOURS SCHEDULED
Status: DISCONTINUED | OUTPATIENT
Start: 2022-03-23 | End: 2022-03-24 | Stop reason: HOSPADM

## 2022-03-23 RX ORDER — KETAMINE HCL IN NACL, ISO-OSM 100MG/10ML
SYRINGE (ML) INJECTION PRN
Status: DISCONTINUED | OUTPATIENT
Start: 2022-03-23 | End: 2022-03-23 | Stop reason: SDUPTHER

## 2022-03-23 RX ORDER — SODIUM CHLORIDE 9 MG/ML
25 INJECTION, SOLUTION INTRAVENOUS PRN
Status: DISCONTINUED | OUTPATIENT
Start: 2022-03-23 | End: 2022-03-23

## 2022-03-23 RX ORDER — NICOTINE POLACRILEX 4 MG
15 LOZENGE BUCCAL PRN
Status: DISCONTINUED | OUTPATIENT
Start: 2022-03-23 | End: 2022-03-23

## 2022-03-23 RX ORDER — POTASSIUM CHLORIDE 20 MEQ/1
20 TABLET, EXTENDED RELEASE ORAL
Status: DISCONTINUED | OUTPATIENT
Start: 2022-03-24 | End: 2022-03-24 | Stop reason: HOSPADM

## 2022-03-23 RX ORDER — SODIUM CHLORIDE 0.9 % (FLUSH) 0.9 %
5-40 SYRINGE (ML) INJECTION PRN
Status: DISCONTINUED | OUTPATIENT
Start: 2022-03-23 | End: 2022-03-24 | Stop reason: HOSPADM

## 2022-03-23 RX ORDER — EPINEPHRINE 1 MG/ML
INJECTION PARENTERAL PRN
Status: DISCONTINUED | OUTPATIENT
Start: 2022-03-23 | End: 2022-03-23 | Stop reason: SDUPTHER

## 2022-03-23 RX ORDER — ASPIRIN 81 MG/1
81 TABLET ORAL DAILY
Status: DISCONTINUED | OUTPATIENT
Start: 2022-03-23 | End: 2022-03-24 | Stop reason: HOSPADM

## 2022-03-23 RX ORDER — ROSUVASTATIN CALCIUM 20 MG/1
20 TABLET, COATED ORAL DAILY
Status: DISCONTINUED | OUTPATIENT
Start: 2022-03-23 | End: 2022-03-24 | Stop reason: HOSPADM

## 2022-03-23 RX ORDER — PROPOFOL 10 MG/ML
INJECTION, EMULSION INTRAVENOUS PRN
Status: DISCONTINUED | OUTPATIENT
Start: 2022-03-23 | End: 2022-03-23 | Stop reason: SDUPTHER

## 2022-03-23 RX ORDER — ONDANSETRON 2 MG/ML
INJECTION INTRAMUSCULAR; INTRAVENOUS PRN
Status: DISCONTINUED | OUTPATIENT
Start: 2022-03-23 | End: 2022-03-23 | Stop reason: SDUPTHER

## 2022-03-23 RX ADMIN — ONDANSETRON HYDROCHLORIDE 4 MG: 2 SOLUTION INTRAMUSCULAR; INTRAVENOUS at 10:09

## 2022-03-23 RX ADMIN — Medication 2000 MG: at 09:47

## 2022-03-23 RX ADMIN — PROPOFOL 20 MG: 10 INJECTION, EMULSION INTRAVENOUS at 09:58

## 2022-03-23 RX ADMIN — NITROGLYCERIN 50 MCG: 5 INJECTION, SOLUTION INTRAVENOUS at 11:13

## 2022-03-23 RX ADMIN — EPINEPHRINE 5 MCG: 1 INJECTION INTRAMUSCULAR; INTRAVENOUS; SUBCUTANEOUS at 11:01

## 2022-03-23 RX ADMIN — SODIUM CHLORIDE: 9 INJECTION, SOLUTION INTRAVENOUS at 07:07

## 2022-03-23 RX ADMIN — PROTAMINE SULFATE 50 MG: 10 INJECTION, SOLUTION INTRAVENOUS at 11:18

## 2022-03-23 RX ADMIN — HYDRALAZINE HYDROCHLORIDE 5 MG: 20 INJECTION INTRAMUSCULAR; INTRAVENOUS at 11:16

## 2022-03-23 RX ADMIN — LIDOCAINE HYDROCHLORIDE 40 MG: 20 INJECTION, SOLUTION INTRAVENOUS at 09:58

## 2022-03-23 RX ADMIN — CEFAZOLIN SODIUM 2000 MG: 10 INJECTION, POWDER, FOR SOLUTION INTRAVENOUS at 20:32

## 2022-03-23 RX ADMIN — EPINEPHRINE 5 MCG: 1 INJECTION INTRAMUSCULAR; INTRAVENOUS; SUBCUTANEOUS at 10:57

## 2022-03-23 RX ADMIN — EPINEPHRINE 5 MCG: 1 INJECTION INTRAMUSCULAR; INTRAVENOUS; SUBCUTANEOUS at 10:40

## 2022-03-23 RX ADMIN — MIDAZOLAM 0.5 MG: 1 INJECTION INTRAMUSCULAR; INTRAVENOUS at 10:33

## 2022-03-23 RX ADMIN — NITROGLYCERIN 50 MCG: 5 INJECTION, SOLUTION INTRAVENOUS at 11:26

## 2022-03-23 RX ADMIN — HEPARIN SODIUM 5000 UNITS: 1000 INJECTION INTRAVENOUS; SUBCUTANEOUS at 10:35

## 2022-03-23 RX ADMIN — NITROGLYCERIN 50 MCG: 5 INJECTION, SOLUTION INTRAVENOUS at 11:11

## 2022-03-23 RX ADMIN — VITAM B12 50 MCG: 100 TAB at 12:46

## 2022-03-23 RX ADMIN — SODIUM CHLORIDE, PRESERVATIVE FREE 10 ML: 5 INJECTION INTRAVENOUS at 20:32

## 2022-03-23 RX ADMIN — PROPOFOL 40 MCG/KG/MIN: 10 INJECTION, EMULSION INTRAVENOUS at 09:59

## 2022-03-23 RX ADMIN — NITROGLYCERIN 100 MCG: 5 INJECTION, SOLUTION INTRAVENOUS at 11:06

## 2022-03-23 RX ADMIN — HYDRALAZINE HYDROCHLORIDE 5 MG: 20 INJECTION INTRAMUSCULAR; INTRAVENOUS at 11:30

## 2022-03-23 RX ADMIN — PHENYLEPHRINE HYDROCHLORIDE 50 MCG: 10 INJECTION INTRAVENOUS at 10:27

## 2022-03-23 RX ADMIN — 0.12% CHLORHEXIDINE GLUCONATE 15 ML: 1.2 RINSE ORAL at 07:07

## 2022-03-23 RX ADMIN — LISINOPRIL 10 MG: 10 TABLET ORAL at 12:47

## 2022-03-23 RX ADMIN — MUPIROCIN: 20 OINTMENT TOPICAL at 07:09

## 2022-03-23 RX ADMIN — MIDAZOLAM 0.5 MG: 1 INJECTION INTRAMUSCULAR; INTRAVENOUS at 10:00

## 2022-03-23 RX ADMIN — NITROGLYCERIN 50 MCG: 5 INJECTION, SOLUTION INTRAVENOUS at 11:33

## 2022-03-23 RX ADMIN — SODIUM CHLORIDE: 9 INJECTION, SOLUTION INTRAVENOUS at 10:38

## 2022-03-23 RX ADMIN — ROSUVASTATIN 20 MG: 20 TABLET, FILM COATED ORAL at 12:46

## 2022-03-23 RX ADMIN — Medication 10 MG: at 10:19

## 2022-03-23 RX ADMIN — DEXAMETHASONE SODIUM PHOSPHATE 10 MG: 4 INJECTION, SOLUTION INTRAMUSCULAR; INTRAVENOUS at 10:09

## 2022-03-23 RX ADMIN — METOPROLOL SUCCINATE 25 MG: 50 TABLET, EXTENDED RELEASE ORAL at 12:47

## 2022-03-23 RX ADMIN — EPINEPHRINE 10 MCG: 1 INJECTION INTRAMUSCULAR; INTRAVENOUS; SUBCUTANEOUS at 11:04

## 2022-03-23 RX ADMIN — SODIUM CHLORIDE: 9 INJECTION, SOLUTION INTRAVENOUS at 10:40

## 2022-03-23 ASSESSMENT — PAIN SCALES - GENERAL
PAINLEVEL_OUTOF10: 0

## 2022-03-23 ASSESSMENT — PAIN - FUNCTIONAL ASSESSMENT: PAIN_FUNCTIONAL_ASSESSMENT: 0-10

## 2022-03-23 ASSESSMENT — ENCOUNTER SYMPTOMS: SHORTNESS OF BREATH: 1

## 2022-03-23 NOTE — PROGRESS NOTES
HEART VALVE TEAM PROGRESS NOTE    Evaluated in ICU POD # 0. No complaints. No pain in groins. No issues since admission to ICU. PE:   /81   Pulse 63   Temp 96 °F (35.6 °C) (Temporal)   Resp 17   Ht 4' 9\" (1.448 m)   Wt 102 lb (46.3 kg)   SpO2 93%   BMI 22.07 kg/m²   CARDIOVASCULAR:   Heart Inspection shows no noted pulsations  Heart Palpation: no heaves or thrills, PMI in 5th ISC near left midclavicular line   Heart Ausculation -Normal S1 and S2, RRR, no murmur, s3, s4 or rub noted. PV: no extremity edema. No varicosities. Pedal pulses palpable, no clubbing or cyanosis     Monitor: SR      Lab Review     Recent Labs     03/23/22  1200   WBC 9.9   HGB 10.9*   HCT 33.8*          Recent Labs     03/23/22  1200      K 3.9      CO2 21*   BUN 13   CREATININE 0.6       No results for input(s): AST, ALT, ALB, BILIDIR, ALKPHOS in the last 72 hours. No results for input(s): BNP, CKTOTAL, CKMB in the last 72 hours. No results for input(s): BNP, INR in the last 72 hours. Assessment:  1. POD # 0 TAVR with 23-mm EUSEBIO 3 Ultra valve. Hemodynamically stable. 2. Stable groin sites bilaterally. 3. HFrEF  4. HTN    Plan:  1. Continue ICU care for 6 hours then transfer to CSU  2. EKG, TTE and labs in AM  3.  Aspirin daily

## 2022-03-23 NOTE — ANESTHESIA POSTPROCEDURE EVALUATION
Department of Anesthesiology  Postprocedure Note    Patient: Kristie Mar  MRN: [id number]  YOB: 1945  Date of evaluation: 3/23/2022  Time:  12:04 PM     Procedure Summary     Date: 03/23/22 Room / Location: [facility] OR 03 / [facility]    Anesthesia Start: 6898 Anesthesia Stop: 5932    Procedure: TRANSCATHETER AORTIC VALVE REPLACEMENT (N/A ) Diagnosis: (AORTIC STENOSIS)    Surgeons: Jorge Malone MD Responsible Provider: Michael Dean DO    Anesthesia Type: general ASA Status: 4          Anesthesia Type: general    Heber Phase I: Heber Score: 10    Heber Phase II:      Last vitals: Reviewed and per EMR flowsheets.        Anesthesia Post Evaluation    Patient location during evaluation: ICU  Patient participation: complete - patient cannot participate  Level of consciousness: awake and alert  Airway patency: patent  Nausea & Vomiting: no nausea and no vomiting  Complications: no  Cardiovascular status: blood pressure returned to baseline  Respiratory status: acceptable  Hydration status: euvolemic

## 2022-03-23 NOTE — ANESTHESIA PRE PROCEDURE
Department of Anesthesiology  Preprocedure Note       Name:  Rene Acevedo   Age:  67 y.o.  :  1945                                          MRN:  [id number]         Date:  3/23/2022      Surgeon: Javon Caldera):  MD Oralia Quintero DO    Procedure: TRANSCATHETER AORTIC VALVE REPLACEMENT (N/A)    Medications prior to admission:   Prior to Admission medications    Medication Sig Start Date End Date Taking? Authorizing Provider   rosuvastatin (CRESTOR) 20 MG tablet Take 1 tablet by mouth daily 3/16/22   Guillaume Garcia, DO   lisinopril (PRINIVIL;ZESTRIL) 10 MG tablet Take 1 tablet by mouth daily 3/16/22   Quentin Jolly, DO   metoprolol succinate (TOPROL XL) 25 MG extended release tablet Take 1 tablet by mouth daily 3/16/22   Quentin Jolly, DO   torsemide (DEMADEX) 10 MG tablet Take 1 tablet by mouth daily 3/16/22   Quentin Jolly, DO   potassium chloride (KLOR-CON M) 20 MEQ extended release tablet Take 1 tablet by mouth daily (with breakfast) 3/16/22   Quentin Jolly, DO   vitamin B-12 (CYANOCOBALAMIN) 100 MCG tablet Take 50 mcg by mouth daily    Historical Provider, MD   aspirin 81 MG EC tablet Take 81 mg by mouth daily    Historical Provider, MD       Current medications:    No current facility-administered medications for this visit. No current outpatient medications on file.      Facility-Administered Medications Ordered in Other Visits   Medication Dose Route Frequency Provider Last Rate Last Admin    0.9 % sodium chloride infusion   IntraVENous Continuous MADYSON Menchaca 20 mL/hr at 22 0707 New Bag at 22 0707    0.9 % sodium chloride infusion  25 mL IntraVENous PRN MADYSON Menchaca        ceFAZolin (ANCEF) 2000 mg in sterile water 20 mL IV syringe  2,000 mg IntraVENous On Call to [address] [place] [address]        sodium chloride flush 0.9 % injection 10 mL  10 mL IntraVENous 2 times per day MADYSON Menchaca        sodium chloride flush 0.9 % injection 10 mL 10 mL IntraVENous PRN MADYSON Lagunas        perflutren lipid microspheres (DEFINITY) injection 1.65 mg  1.5 mL IntraVENous ONCE PRN MADYSON Lagunas           Allergies:  No Known Allergies    Problem List:    Patient Active Problem List   Diagnosis Code    Hypertension I10    NSTEMI (non-ST elevated myocardial infarction) ([facility] [address] I21.4    Severe aortic stenosis I35.0       Past Medical History:        Diagnosis Date    Hypertension        Past Surgical History:        Procedure Laterality Date    CARDIAC CATHETERIZATION  03/15/2022    Dr. Keli Park, TOTAL ABDOMINAL      JOINT REPLACEMENT  03/12/2019    left knee    TRANSESOPHAGEAL ECHOCARDIOGRAM  03/15/2022    Dr Cole Beverage       Social History:    Social History     Tobacco Use    Smoking status: Never Smoker    Smokeless tobacco: Never Used   Substance Use Topics    Alcohol use: Not Currently                                Counseling given: Not Answered      Vital Signs (Current): There were no vitals filed for this visit.                                            BP Readings from Last 3 Encounters:   03/23/22 121/81   03/16/22 126/75   03/15/22 (!) 103/50       NPO Status:                                                                                 BMI:   Wt Readings from Last 3 Encounters:   03/23/22 102 lb (46.3 kg)   03/16/22 102 lb 9.6 oz (46.5 kg)   12/07/21 113 lb (51.3 kg)     There is no height or weight on file to calculate BMI.    CBC:   Lab Results   Component Value Date    WBC 11.2 03/16/2022    RBC 4.31 03/16/2022    HGB 12.4 03/16/2022    HCT 37.6 03/16/2022    MCV 87.2 03/16/2022    RDW 14.6 03/16/2022     03/16/2022       CMP:   Lab Results   Component Value Date     03/16/2022    K 4.5 03/16/2022    K 4.1 03/11/2022     03/16/2022    CO2 25 03/16/2022    BUN 12 03/16/2022    CREATININE 0.6 03/16/2022    GFRAA >60 03/16/2022    LABGLOM >60 03/16/2022    GLUCOSE 121 03/16/2022    PROT 4.9 03/14/2022    CALCIUM 9.4 03/16/2022    BILITOT 0.5 03/14/2022    ALKPHOS 51 03/14/2022    AST 79 03/14/2022     03/14/2022       POC Tests: No results for input(s): POCGLU, POCNA, POCK, POCCL, POCBUN, POCHEMO, POCHCT in the last 72 hours. Coags:   Lab Results   Component Value Date    PROTIME 10.8 03/11/2022    INR 1.0 03/11/2022    APTT 27.1 03/13/2022       HCG (If Applicable): No results found for: PREGTESTUR, PREGSERUM, HCG, HCGQUANT     ABGs: No results found for: PHART, PO2ART, EPY4BYY, FAU2TMA, BEART, C1SACIWN     Type & Screen (If Applicable):  No results found for: LABABO, LABRH    Drug/Infectious Status (If Applicable):  No results found for: HIV, HEPCAB    COVID-19 Screening (If Applicable): No results found for: COVID19    EKG 3/12/2022  NSR  LBBB    ECHO 3/12/2022   Summary   Dilated left ventricle (ESD 4.4). Estimated left ventricle ejection   fraction 37 % using Biplane MOD (visually 20-30%). Normal right ventricular size. Right ventricle global systolic function is   low normal .   Suspected left to right interatrial shunt. Moderate-to-severe mitral regurgitation is present. Severe mitral annular   calcification with functional mitral stenosis. Mild-to-moderate aortic regurgitation is noted. Critical aortic stenosis. Vmax 4.6, MG 55, DI 0.12, CRUZ 0.4 (LVOTd 2.0)   Moderate tricuspid regurgitation. RVSP is ~63 mmHg suggesting severe   pulmonary hypertension. Normal Inferior Vena Cava diameter and respiratory variation. Moderate left pleural effusion. Small pericardial effusion without signs of tamponade. ECHO TRANSESOPHAGEAL  03/15/2022    Findings      Left Ventricle   Dilated left ventricle with severe LV systolic dysfunction. Ejection fraction is visually estimated at 20-25%. Right Ventricle   Normal right ventricular size and function. Left Atrium   Normal sized left atrium. No evidence of thrombus within left atrium or appendage. Stretched PFO with L to R shunting by color flow Doppler. Agitated saline injected for shunt evaluation. No right to left shunting of bubbles. Right Atrium   Normal right atrial size. Mitral Valve   Structurally normal mitral valve. No evidence of mitral valve stenosis. Moderate central mitral regurgitation. Tricuspid Valve   The tricuspid valve appears structurally normal.   Mild tricuspid regurgitation. Aortic Valve   Aortic valve leaflets are severely calcified. Leaflets are nearly immobile. Critical aortic stenosis is present. Peak velocity 4.2 m/s. Mean gradient 43 mmHg. CRUZ 0.3 cm2.   DI 0.10. Moderate aortic valve regurgitation. VC 0.4 cm.  ms. No holodiastolic flow reversal.      Pulmonic Valve   The pulmonic valve was not well visualized. No evidence of pulmonic valve stenosis. No evidence of any pulmonic regurgitation. Pericardial Effusion   Small circumferential pericardial effusion. Pleural Effusion   Pleural effusion. Miscellaneous   LUPV: Systolic blunting   LLPV: Systolic blunting   RUPV: Systolic blunting   RLPV: Systolic blunting      Conclusions      Summary   Ejection fraction is visually estimated at 20-25%. Stretched PFO with L to R shunting by color flow Doppler. Critical aortic stenosis is present. Moderate aortic valve regurgitation. Moderate central mitral regurgitation. Mild tricuspid regurgitation. CARDIAC CATHETERIZATION  03/15/2022    Angiographic Results/findings:  Left Main: No angiographically significant stenosis  LAD: Mild diffuse luminal irregularities. Distal diffuse 75 to 80% stenosis. D1: Ostial 70% stenosis. 1.8 mm vessel. D2: 1.5 mm vessel. No angiographically significant stenosis. Cx: No angiographically significant stenosis. OM1: Tiny vessel. OM2: Pending vessel. Ramus: Very large vessel with multiple branches. Proximal and mid mild diffuse luminal irregularities. RCA: Dominant. Proximal diffuse 70% stenosis. PDA: No angiographically significant stenosis. PLB: No angiographically significant stenosis. Procedure:   After obtaining informed consent the patient was taken to the cardiac Cath Lab where the area over the right radial artery was prepped and draped in a sterile fashion. Using ultrasound guidance and a micropuncture technique a 6 [place] slender rain sheath was placed in the right radial artery. This was aspirated & flushed several times throughout the procedure. This was medicated with verapamil and nitroglycerin. They were given heparin systemically. A [address] TIG catheter was advanced over a wire to the root of the aorta. It was aspirated & flushed with saline. Pressures were obtained. It was filled with contrast.  This was then manipulated into the left main coronary artery. 4 orthogonal views were obtained. A Brandt right catheter was then manipulated into the right coronary artery and 3 orthogonal views were then obtained. The right radial artery sheath was removed and a vasc band was then placed with good patent hemostasis. They tolerated the procedure well with no complications.     Note: This report was completed using computerized voice recognition software. Every effort has been made to ensure accuracy, however; and invert and computerized transcription errors may be present.     XR CHEST STANDARD  03/11/2022    Narrative   EXAMINATION:   TWO XRAY VIEWS OF THE CHEST       3/11/2022 5:25 pm       COMPARISON:   None.       HISTORY:   ORDERING SYSTEM PROVIDED HISTORY: jean-baptiste   TECHNOLOGIST PROVIDED HISTORY:   Reason for exam:->jean-baptiste       FINDINGS:   Cardiomegaly is identified.  Diffuse, increased interstitial markings are   identified bilaterally and particularly in the perihilar regions, where there   is pulmonary vascular enlargement.  Small bilateral pleural effusions are   noted.  The findings are consistent with CHF.  There is a linear, oblique   density seen in the left lower lung field, probably representing platelike   atelectasis.  An ill-defined consolidation is noted in the medial right lower   lung field, overlying the right cardiac border.  This could represent focal   lung collapse or pneumonia.  The thoracic aorta is atherosclerotic.  There is   buckling of the trachea to the right, probably secondary to a prominent   aortic knob.  A thoracolumbar scoliosis is seen.  Osteo-degenerative changes   are noted involving the visualized thoracolumbar spine.           Impression   CHF.  Probable platelike atelectasis in the left lower lung field.  Rule   focal lung collapse versus pneumonia in the medial right lower lung field.               Anesthesia Evaluation  Patient summary reviewed and Nursing notes reviewed no history of anesthetic complications:   Airway: Mallampati: I  TM distance: >3 FB   Neck ROM: full  Mouth opening: > = 3 FB Dental:          Pulmonary:   (+) shortness of breath:  decreased breath sounds,                             Cardiovascular:  Exercise tolerance: poor (<4 METS),   (+) hypertension:, valvular problems/murmurs (CRITICAL AORTIC STENOSIS): AS, AI and MR, past MI: < 1 month, CAD:, LUIS:, pulmonary hypertension: severe,       ECG reviewed  Rhythm: regular  Rate: normal  Echocardiogram reviewed  Stress test reviewed             ROS comment: left ventricle ejection fraction 37 %  fraction 37 % using Biplane MOD (visually 20-30%). Suspected left to right interatrial shunt. Moderate-to-severe mitral regurgitation is present. Severe mitral annular calcification with functional mitral stenosis. Mild-to-moderate aortic regurgitation is noted. Critical aortic stenosis. Vmax 4.6, MG 55, DI 0.12, CRUZ 0.4 (LVOTd 2.0)    Moderate tricuspid regurgitation. RVSP is ~63 mmHg suggesting severe pulmonary hypertension. Moderate left pleural effusion. Small pericardial effusion without signs of tamponade.     Severe cardiomyopathy Neuro/Psych:               GI/Hepatic/Renal:             Endo/Other:                     Abdominal:             Vascular: Other Findings:               Anesthesia Plan      general     ASA 4       Induction: intravenous. Anesthetic plan and risks discussed with patient. Plan discussed with attending.                 Faustino Long RN   3/23/2022

## 2022-03-23 NOTE — OP NOTE
TRANSCATHETER AORTIC VALVE REPLACEMENT  Operative Note      Date of procedure:  03/23/22     Interventional Cardiologist: Victor Manuel Vasquez MD  Cardiothoracic Surgeon: Matt Fofana DO    Pre-operative diagnosis: Severe symptomatic aortic stenosis. Post-operative diagnosis: Successful transcatheter aortic valve replacement (TAVR) using a 23-mm EUSEBIO 3 Ultra valve. MCFP (Major co morbidities and conditions):   Distal LAD stenosis, treated medically  Critical aortic stenosis with moderate AR  HFrEF with severely reduced LVEF at the recent decompensation  Moderately severe MR  Frailty  History of hypertension  Left bundle branch block  NYHA class 3    STS-PROM estimated risk for 30-day mortality for isolated AVR:  8.3%      TAVR access: right common femoral artery percutaneous approach  Pigtail access: left common femoral artery  Appropriate arterial and venous lines were additionally placed by anesthesia as needed. TAVR access closure: Prostyle Perclose   Pigtail access closure: Prostyle Perclose    Procedure:   Arterial access in the TAVR and Pigtail access sites as above  Limited peripheral angiography   Temporary transvenous pacer insertion via left femoral vein   Aortic root angiography  Transfemoral TAVR using a 23-mm EUSEBIO 3 Ultra valve      Anesthesia: deep sedation/MAC    Indication for procedure:   Severe symptomatic aortic stenosis    Severe aortic stenosis Shared Decision Making discussion took place using the [address] of Cardiology AS: TAVR/SAVR tool. The treatment plan formulated by the Heart Team and the patient & the patients preference for AVR is TAVR    Description of the Procedure: Following informed consent, the patient was bought to the hybrid OR and placed under anesthesia as above. Transthoracic echo was used to aid in guidance of the procedure.      Using ultrasound guidance and angiographic confirmation, a a micropuncture needle  was used to access the RCFA and a placeholder sheath was placed; two Prostyle Perclose devices were placed in a \"pre-close\" fashion; this access will be used to deliver the trascatheter heart valve eventually. Using ultrasound guidance, a micropuncture needle was used to access the LCFA and appropriate site was confirmed using angiography and a 5F sheath was placed; this access will be used for the Pigtail catheter subsequently. The left femoral vein was accessed and a locking 6F sheath was placed for the temporary pacer. Unfractionated heparin was administered to achieve therapeutic ACT with additional heparin administered as needed    A balloon-tipped temporary transvenous pacemaker was inserted through the femoral venous sheath and into the RV apex. Pacing threshold were obtained and the pacemaker was placed in standby mode. A 5F marker pigtail catheter was then advanced into the aorta and placed at the level of the aortic cusps. A baseline aortic root angiogram was performed to confirm a three-cusp, co-planar angle for valve implant. A 5F multipurpose catheter was advanced into the descending aorta over a J wire and used to place a Lunderquist wire in the descending aorta. A 14F Oropeza E-Sheath was placed over the Lunderquist wire and sutured in place; the wire was subsequently removed. Next, a 5F AL1 catheter was used to guide a straight-tipped 0.035\" wire to cross the aortic valve. The catheter was advanced into the left ventricle and then exchanged for a second Pigtail catheter. A Safari wire was then placed in the LV and the Pigtail catheter removed. Balloon aortic valvuloplasty was not performed prior to THV placement. A Commander delivery system, preloaded with the transcatheter valve was advanced into the descending aorta where valve alignment with the balloon was performed. The THV then was advanced across the aortic valve and aligned with the annular plane in the pre-determined co-planar angle.  With rapid pacing at 180 beats per minute, the valve was deployed in a standard fashion using nominal volume in the balloon. The implant depth was estimated to be 90/10% aortic/ventricular. Of note there was a prominent calcium \"shelf\" at the STJ above the noncoronary cusp. We deployed the valve in 2 stages to minimize that interaction. Post-dilation was not performed. The delivery system and the stiff wire were removed. Transthoracic echo demonstrated a well-seated prosthesis with a mean transvalvular gradient of 9 and no paravalvular leak. There was no pericardial effusion and the LV systolic function was moderately reduced. Aortic root angiogram revealed no AR. IV protamine was administered to reverse anticoagulation. The temporary pacer was removed. A selective right iliofemoral angiogram was performed from the left femoral access using a 5F EVERARDO catheter in an up and over fashion. This demonstrated no significant stenosis, dissection, or extravasation at the TAVR access site. Hemostasis was achieved at the arterial sites as above and at the venous site using manual pressure. Complications: None  Blood loss: Less than 50 mL  Contrast use: 55 mL  Air Kerma: 237 mGy  Dose-area product: 2188.48 mcGy. m2  Fluoroscopy time: 13.7 minutes  Specimens: none    Plan:   1. PACU to CVICU observation   2. EKG in the morning  Limited TTE in the morning  CBC and BMP in the morning  3. Antithrombotic regimen: Aspirin 81 mg daily monotherapy    The interventional cardiologist and cardiothoracic surgeon were scrubbed in for the entirety of the procedure and contributed equally.       Le Ryan MD, [facility] [place]  Interventional Cardiology/Structural Heart Disease

## 2022-03-23 NOTE — ANESTHESIA PROCEDURE NOTES
Arterial Line:    An arterial line was placed using surface landmarks, in the holding area for the following indication(s): continuous blood pressure monitoring and blood sampling needed. A 20 gauge (size), 1 and 3/8 inch (length), Arrow (type) catheter was placed, Seldinger technique not used, into the right radial artery, secured by tape and Tegaderm. Anesthesia type: Local  Local infiltration: Injection    Events:  patient tolerated procedure well with no complications. Anesthesiologist: Enoch Gorman DO  Other anesthesia staff: Jayy Aviles RN  Performed:  Other anesthesia staff   Preanesthetic Checklist  Completed: patient identified, IV checked, site marked, risks and benefits discussed, surgical consent, monitors and equipment checked, pre-op evaluation, timeout performed, anesthesia consent given, oxygen available and patient being monitored

## 2022-03-23 NOTE — OP NOTE
Operative Note        Date of procedure:  03/23/22      Interventional Cardiologist: Linh Martin MD  Cardiothoracic Surgeon: Cherelle Coon DO     Pre-operative diagnosis: Severe symptomatic aortic stenosis. Post-operative diagnosis: Same      skilled nursing (Major co morbidities and conditions):   Distal LAD stenosis, treated medically  Critical aortic stenosis with moderate AR  HFrEF with severely reduced LVEF at the recent decompensation  Moderately severe MR  Frailty  History of hypertension  Left bundle branch block  NYHA class 3     STS-PROM estimated risk for 30-day mortality for isolated AVR:  8.3%       Procedure:   Arterial access in the TAVR and Pigtail access sites as above  Limited peripheral angiography   Temporary transvenous pacer insertion via left femoral vein   Aortic root angiography  Transfemoral TAVR using a 23-mm EUSEBIO 3 Ultra valve        Anesthesia: deep sedation/MAC     Indication for procedure:   Severe symptomatic aortic stenosis     Severe aortic stenosis Shared Decision Making discussion took place using the [employer] AS: TAVR/SAVR tool. The treatment plan formulated by the Heart Team and the patient & the patients preference for AVR is [facility]     Description of the Procedure: Following informed consent, the patient was bought to the hybrid OR and placed under anesthesia as above. Transthoracic echo was used to aid in guidance of the procedure.      Using ultrasound guidance and angiographic confirmation, a a micropuncture needle  was used to access the RCFA and a placeholder sheath was placed; two Prostyle Perclose devices were placed in a \"pre-close\" fashion; this access will be used to deliver the trascatheter heart valve eventually. Using ultrasound guidance, a micropuncture needle was used to access the LCFA and appropriate site was confirmed using angiography and a 5F sheath was placed; this access will be used for the Pigtail catheter subsequently.  The left femoral vein was accessed and a locking 6F sheath was placed for the temporary pacer.      Unfractionated heparin was administered to achieve therapeutic ACT with additional heparin administered as needed     A balloon-tipped temporary transvenous pacemaker was inserted through the femoral venous sheath and into the RV apex. Pacing threshold were obtained and the pacemaker was placed in standby mode. A 5F marker pigtail catheter was then advanced into the aorta and placed at the level of the aortic cusps. A baseline aortic root angiogram was performed to confirm a three-cusp, co-planar angle for valve implant.      A 5F multipurpose catheter was advanced into the descending aorta over a J wire and used to place a Lunderquist wire in the descending aorta. A 14F Oropeza E-Sheath was placed over the Lunderquist wire and sutured in place; the wire was subsequently removed.     Next, a 5F AL1 catheter was used to guide a straight-tipped 0.035\" wire to cross the aortic valve. The catheter was advanced into the left ventricle and then exchanged for a second Pigtail catheter. A Safari wire was then placed in the LV and the Pigtail catheter removed.       Balloon aortic valvuloplasty was not performed prior to THV placement.      A Commander delivery system, preloaded with the transcatheter valve was advanced into the descending aorta where valve alignment with the balloon was performed. The THV then was advanced across the aortic valve and aligned with the annular plane in the pre-determined co-planar angle. With rapid pacing at 180 beats per minute, the valve was deployed in a standard fashion using nominal volume in the balloon. The implant depth was estimated to be 90/10% aortic/ventricular. Of note there was a prominent calcium \"shelf\" at the STJ above the noncoronary cusp. We deployed the valve in 2 stages to minimize that interaction.     Post-dilation was not performed.  The delivery system and the stiff wire

## 2022-03-24 VITALS
HEIGHT: 57 IN | OXYGEN SATURATION: 97 % | SYSTOLIC BLOOD PRESSURE: 116 MMHG | TEMPERATURE: 97.7 F | WEIGHT: 102 LBS | HEART RATE: 58 BPM | RESPIRATION RATE: 16 BRPM | DIASTOLIC BLOOD PRESSURE: 47 MMHG | BODY MASS INDEX: 22.01 KG/M2

## 2022-03-24 LAB
ACTIVATED CLOTTING TIME: 116 SECONDS (ref 99–130)
ANION GAP SERPL CALCULATED.3IONS-SCNC: 9 MMOL/L (ref 7–16)
BUN BLDV-MCNC: 16 MG/DL (ref 6–23)
CALCIUM SERPL-MCNC: 8.6 MG/DL (ref 8.6–10.2)
CHLORIDE BLD-SCNC: 105 MMOL/L (ref 98–107)
CO2: 24 MMOL/L (ref 22–29)
CREAT SERPL-MCNC: 0.7 MG/DL (ref 0.5–1)
EKG ATRIAL RATE: 59 BPM
EKG P AXIS: 56 DEGREES
EKG P-R INTERVAL: 202 MS
EKG Q-T INTERVAL: 512 MS
EKG QRS DURATION: 166 MS
EKG QTC CALCULATION (BAZETT): 506 MS
EKG R AXIS: -6 DEGREES
EKG T AXIS: 173 DEGREES
EKG VENTRICULAR RATE: 59 BPM
GFR AFRICAN AMERICAN: >60
GFR NON-AFRICAN AMERICAN: >60 ML/MIN/1.73
GLUCOSE BLD-MCNC: 156 MG/DL (ref 74–99)
HCT VFR BLD CALC: 34.9 % (ref 34–48)
HEMOGLOBIN: 10.9 G/DL (ref 11.5–15.5)
MCH RBC QN AUTO: 28.5 PG (ref 26–35)
MCHC RBC AUTO-ENTMCNC: 31.2 % (ref 32–34.5)
MCV RBC AUTO: 91.1 FL (ref 80–99.9)
PDW BLD-RTO: 14.3 FL (ref 11.5–15)
PLATELET # BLD: 261 E9/L (ref 130–450)
PMV BLD AUTO: 10.6 FL (ref 7–12)
POC ACT LR: 148 SECONDS
POC ACT LR: 324 SECONDS
POTASSIUM SERPL-SCNC: 3.5 MMOL/L (ref 3.5–5)
RBC # BLD: 3.83 E12/L (ref 3.5–5.5)
SODIUM BLD-SCNC: 138 MMOL/L (ref 132–146)
WBC # BLD: 15.4 E9/L (ref 4.5–11.5)

## 2022-03-24 PROCEDURE — 6370000000 HC RX 637 (ALT 250 FOR IP): Performed by: PHYSICIAN ASSISTANT

## 2022-03-24 PROCEDURE — 6360000002 HC RX W HCPCS: Performed by: PHYSICIAN ASSISTANT

## 2022-03-24 PROCEDURE — 93308 TTE F-UP OR LMTD: CPT

## 2022-03-24 PROCEDURE — 2500000003 HC RX 250 WO HCPCS: Performed by: PHYSICIAN ASSISTANT

## 2022-03-24 PROCEDURE — 2580000003 HC RX 258: Performed by: PHYSICIAN ASSISTANT

## 2022-03-24 PROCEDURE — 36415 COLL VENOUS BLD VENIPUNCTURE: CPT

## 2022-03-24 PROCEDURE — 99232 SBSQ HOSP IP/OBS MODERATE 35: CPT | Performed by: PHYSICIAN ASSISTANT

## 2022-03-24 PROCEDURE — 80048 BASIC METABOLIC PNL TOTAL CA: CPT

## 2022-03-24 PROCEDURE — 93005 ELECTROCARDIOGRAM TRACING: CPT | Performed by: PHYSICIAN ASSISTANT

## 2022-03-24 PROCEDURE — 93005 ELECTROCARDIOGRAM TRACING: CPT | Performed by: INTERNAL MEDICINE

## 2022-03-24 PROCEDURE — 93010 ELECTROCARDIOGRAM REPORT: CPT | Performed by: INTERNAL MEDICINE

## 2022-03-24 PROCEDURE — 85027 COMPLETE CBC AUTOMATED: CPT

## 2022-03-24 RX ADMIN — POTASSIUM CHLORIDE 20 MEQ: 20 TABLET, EXTENDED RELEASE ORAL at 09:12

## 2022-03-24 RX ADMIN — CEFAZOLIN SODIUM 2000 MG: 10 INJECTION, POWDER, FOR SOLUTION INTRAVENOUS at 05:18

## 2022-03-24 RX ADMIN — SODIUM CHLORIDE, PRESERVATIVE FREE 10 ML: 5 INJECTION INTRAVENOUS at 05:18

## 2022-03-24 RX ADMIN — LISINOPRIL 10 MG: 10 TABLET ORAL at 09:12

## 2022-03-24 RX ADMIN — VITAM B12 50 MCG: 100 TAB at 09:12

## 2022-03-24 RX ADMIN — ASPIRIN 81 MG: 81 TABLET, COATED ORAL at 09:12

## 2022-03-24 RX ADMIN — TORSEMIDE 10 MG: 10 TABLET ORAL at 09:12

## 2022-03-24 RX ADMIN — SODIUM CHLORIDE, PRESERVATIVE FREE 10 ML: 5 INJECTION INTRAVENOUS at 09:13

## 2022-03-24 RX ADMIN — METOPROLOL SUCCINATE 25 MG: 50 TABLET, EXTENDED RELEASE ORAL at 09:12

## 2022-03-24 RX ADMIN — CEFAZOLIN SODIUM 2000 MG: 10 INJECTION, POWDER, FOR SOLUTION INTRAVENOUS at 09:12

## 2022-03-24 RX ADMIN — ROSUVASTATIN 20 MG: 20 TABLET, FILM COATED ORAL at 09:12

## 2022-03-24 ASSESSMENT — PAIN SCALES - GENERAL: PAINLEVEL_OUTOF10: 0

## 2022-03-24 NOTE — PROGRESS NOTES
TAVR TEAM PROGRESS NOTE      POD # 1 s/p TAVR with 23-mm EUSEBIO 3 Ultra; Daysi Byrd/Diego    TAVR access: right common femoral artery percutaneous approach  Pigtail access: left common femoral artery    Subjective:    Resting comfortably in bed; denies complaints   Ambulating to and from rest room without difficulty   Afebrile and on room air with sats %   WBC elevated 15.4 today but all other labs stable      Current Facility-Administered Medications   Medication Dose Route Frequency Provider Last Rate Last Admin    aspirin EC tablet 81 mg  81 mg Oral Daily MADYSON Linder   81 mg at 03/24/22 0912    lisinopril (PRINIVIL;ZESTRIL) tablet 10 mg  10 mg Oral Daily MADYSON Linder   10 mg at 03/24/22 0912    metoprolol succinate (TOPROL XL) extended release tablet 25 mg  25 mg Oral Daily MADYSON Linder   25 mg at 03/24/22 0912    potassium chloride (KLOR-CON M) extended release tablet 20 mEq  20 mEq Oral Daily with breakfast MADYSON Linder   20 mEq at 03/24/22 0912    rosuvastatin (CRESTOR) tablet 20 mg  20 mg Oral Daily MADYSON Linder   20 mg at 03/24/22 0912    vitamin B-12 (CYANOCOBALAMIN) tablet 50 mcg  50 mcg Oral Daily MADYSON Linder   50 mcg at 03/24/22 0912    sodium chloride flush 0.9 % injection 5-40 mL  5-40 mL IntraVENous 2 times per day MADYSON Linder   10 mL at 03/24/22 0913    sodium chloride flush 0.9 % injection 5-40 mL  5-40 mL IntraVENous PRN MADYSON Linder   10 mL at 03/24/22 0518    0.9 % sodium chloride infusion  25 mL IntraVENous PRN MADYSON Linder        ondansetron (ZOFRAN-ODT) disintegrating tablet 4 mg  4 mg Oral Q8H PRN MADYSON Linder        Or    ondansetron (ZOFRAN) injection 4 mg  4 mg IntraVENous Q6H PRN MADYSON Linder        oxyCODONE-acetaminophen (PERCOCET) 5-325 MG per tablet 1 tablet  1 tablet Oral Q4H PRN MADYSON Linder        ceFAZolin (ANCEF) 2000 mg in sterile water 20 mL IV syringe 2,000 mg IntraVENous Q8H MADYSON Urbina   2,000 mg at 03/24/22 0912    perflutren lipid microspheres (DEFINITY) injection 1.65 mg  1.5 mL IntraVENous ONCE PRN [place] Bl[place]        torsemide [facility]) tablet 10 mg  10 mg Oral Daily MADYSON Urbina   10 mg at 03/24/22 0912           Intake/Output Summary (Last 24 hours) at 3/24/2022 1236  Last data filed at 3/23/2022 1810  Gross per 24 hour   Intake 200 ml   Output --   Net 200 ml       Patient Vitals for the past 96 hrs (Last 3 readings):   Weight   03/23/22 0643 102 lb (46.3 kg)          Physical Exam:  BP (!) 106/50   Pulse 61   Temp 98.1 °F (36.7 °C) (Temporal)   Resp 14   Ht 4' 9\" (1.448 m)   Wt 102 lb (46.3 kg)   SpO2 100%   BMI 22.07 kg/m²     General: No acute distress, appears his stated age, nonicteric  Head: Atraumatic, no gross abnormalities or bruises  Neck: Supple and nontender, no carotid bruits, no JVP  Lungs: Clear to auscultation bilaterally, no wheezes, rales, or rhonchi  Heart: Regular rate and rhythm, no murmurs, rubs, or gallops  Abdomen: Soft, nontender, nondistended, normal bowel sounds  Extremities: No obvious deformities, no cyanosis, no edema  Neurological: Alert and oriented x3, EOMI, moving all extremities x4  Psychological: Normal mood and affect, cooperative  Skin: Color, texture, and turgor normal for age     Access sites: soft, non tender with minimal ecchymoses bilaterally. No hematoma or pulsatile masses. Distal pulses normal. Right femoral site with slight bleeding after removal of dressing. Lab Review     Recent Labs     03/23/22  1200 03/24/22  1038   WBC 9.9 15.4*   HGB 10.9* 10.9*   HCT 33.8* 34.9    261       Recent Labs     03/23/22  1200 03/24/22  1038    138   K 3.9 3.5    105   CO2 21* 24   BUN 13 16   CREATININE 0.6 0.7       No results for input(s): AST, ALT, ALB, BILIDIR, ALKPHOS in the last 72 hours.     No results for input(s): BNP, CKTOTAL, CKMB in the last 72 hours. No results for input(s): BNP, INR in the last 72 hours. EKG pre-TAVR: NSR, ,  LBBB    EKG today: NSR, ,  LBBB    POD1 TTE: completed      Assessment:  1. Critical aortic stenosis s/p TAVR using #23 S3 Ultra valve  2. Acute HFrEF, EF 20%  3. CAD - 80% distal LAD; otherwise moderate disease  4. HTN  5. HLD  6. LBBB     NYHA class IV preop      Plan:  1. Increase activity as tolerated  2. Antithrombotic therapy: aspirin 81 mg daily  3. Access site check in 2 weeks  4. Valve Clinic follow-up in 4 weeks  5. Endocarditis prophylaxis indefinitely before high-risk procedures  6. Inpatient followed by outpatient cardiac rehab  7.  Anticipate discharge later today     Discussed with Dr. Alysha Higgins PAFER

## 2022-03-24 NOTE — CARE COORDINATION
3/24/22 Transition of Care: Chart reviewed. Patient is POD 1 TAVR. Will follow for any needs.  Electronically signed by Samantha Hawkins RN CM on 3/24/2022 at 1:32 PM

## 2022-03-25 LAB
EKG ATRIAL RATE: 60 BPM
EKG P AXIS: 57 DEGREES
EKG P-R INTERVAL: 204 MS
EKG Q-T INTERVAL: 508 MS
EKG QRS DURATION: 174 MS
EKG QTC CALCULATION (BAZETT): 508 MS
EKG R AXIS: 7 DEGREES
EKG T AXIS: 180 DEGREES
EKG VENTRICULAR RATE: 60 BPM

## 2022-03-25 PROCEDURE — 93010 ELECTROCARDIOGRAM REPORT: CPT | Performed by: INTERNAL MEDICINE

## 2022-03-27 LAB
BLOOD BANK DISPENSE STATUS: NORMAL
BLOOD BANK PRODUCT CODE: NORMAL
BPU ID: NORMAL
DESCRIPTION BLOOD BANK: NORMAL

## 2022-03-29 ENCOUNTER — OFFICE VISIT (OUTPATIENT)
Dept: CARDIOLOGY CLINIC | Age: 77
End: 2022-03-29
Payer: MEDICARE

## 2022-03-29 ENCOUNTER — HOSPITAL ENCOUNTER (OUTPATIENT)
Age: 77
Discharge: HOME | End: 2022-03-29
Payer: MEDICARE

## 2022-03-29 VITALS
BODY MASS INDEX: 24.76 KG/M2 | DIASTOLIC BLOOD PRESSURE: 65 MMHG | SYSTOLIC BLOOD PRESSURE: 146 MMHG | WEIGHT: 107 LBS | HEIGHT: 55 IN | TEMPERATURE: 98.2 F | HEART RATE: 62 BPM

## 2022-03-29 DIAGNOSIS — Z48.812 ENCOUNTER FOR SURGICAL AFTERCARE FOLLOWING SURGERY ON THE CIRCULATORY SYSTEM: ICD-10-CM

## 2022-03-29 DIAGNOSIS — T81.718A PSEUDOANEURYSM FOLLOWING PROCEDURE (HCC): Primary | ICD-10-CM

## 2022-03-29 DIAGNOSIS — D72.829 LEUKOCYTOSIS, UNSPECIFIED TYPE: ICD-10-CM

## 2022-03-29 DIAGNOSIS — Z95.2 S/P TAVR (TRANSCATHETER AORTIC VALVE REPLACEMENT): Primary | ICD-10-CM

## 2022-03-29 DIAGNOSIS — Z95.2 S/P TAVR (TRANSCATHETER AORTIC VALVE REPLACEMENT): ICD-10-CM

## 2022-03-29 DIAGNOSIS — R19.09 RIGHT GROIN MASS: ICD-10-CM

## 2022-03-29 DIAGNOSIS — I72.9 PSEUDOANEURYSM FOLLOWING PROCEDURE (HCC): Primary | ICD-10-CM

## 2022-03-29 LAB
HCT VFR BLD CALC: 35.8 % (ref 34–48)
HEMOGLOBIN: 11.7 G/DL (ref 11.5–15.5)
MCH RBC QN AUTO: 28.2 PG (ref 26–35)
MCHC RBC AUTO-ENTMCNC: 32.7 % (ref 32–34.5)
MCV RBC AUTO: 86.3 FL (ref 80–99.9)
PDW BLD-RTO: 13.7 FL (ref 11.5–15)
PLATELET # BLD: 264 E9/L (ref 130–450)
PMV BLD AUTO: 9.9 FL (ref 7–12)
RBC # BLD: 4.15 E12/L (ref 3.5–5.5)
WBC # BLD: 11.2 E9/L (ref 4.5–11.5)

## 2022-03-29 PROCEDURE — 99213 OFFICE O/P EST LOW 20 MIN: CPT | Performed by: PHYSICIAN ASSISTANT

## 2022-03-29 PROCEDURE — G8420 CALC BMI NORM PARAMETERS: HCPCS | Performed by: PHYSICIAN ASSISTANT

## 2022-03-29 PROCEDURE — 1123F ACP DISCUSS/DSCN MKR DOCD: CPT | Performed by: PHYSICIAN ASSISTANT

## 2022-03-29 PROCEDURE — G8400 PT W/DXA NO RESULTS DOC: HCPCS | Performed by: PHYSICIAN ASSISTANT

## 2022-03-29 PROCEDURE — G8428 CUR MEDS NOT DOCUMENT: HCPCS | Performed by: PHYSICIAN ASSISTANT

## 2022-03-29 PROCEDURE — 4040F PNEUMOC VAC/ADMIN/RCVD: CPT | Performed by: PHYSICIAN ASSISTANT

## 2022-03-29 PROCEDURE — 1111F DSCHRG MED/CURRENT MED MERGE: CPT | Performed by: PHYSICIAN ASSISTANT

## 2022-03-29 PROCEDURE — 1090F PRES/ABSN URINE INCON ASSESS: CPT | Performed by: PHYSICIAN ASSISTANT

## 2022-03-29 PROCEDURE — G8484 FLU IMMUNIZE NO ADMIN: HCPCS | Performed by: PHYSICIAN ASSISTANT

## 2022-03-29 PROCEDURE — 85027 COMPLETE CBC AUTOMATED: CPT

## 2022-03-29 PROCEDURE — 1036F TOBACCO NON-USER: CPT | Performed by: PHYSICIAN ASSISTANT

## 2022-03-29 PROCEDURE — 36415 COLL VENOUS BLD VENIPUNCTURE: CPT

## 2022-03-29 RX ORDER — LISINOPRIL 20 MG/1
20 TABLET ORAL DAILY
Qty: 30 TABLET | Refills: 1 | Status: SHIPPED
Start: 2022-03-29 | End: 2022-04-26 | Stop reason: DRUGHIGH

## 2022-03-29 NOTE — ADT AUTH CERT
Utilization Reviews         Myocardial Infarction - Care Day 4 (3/15/2022) by Jones Gottlieb RN       Review Status Review Entered   Completed 3/29/2022 13:32      Criteria Review      Care Day: 4 Care Date: 3/15/2022 Level of Care: Inpatient Floor    Guideline Day 3    Clinical Status    (X) * Hemodynamic stability    3/29/2022 1:32 PM EDT by Nahed Miller      BP: 129/78  NC: 80    (X) * Chest pain, dyspnea, or anginal equivalent absent    3/29/2022 1:32 PM EDT by Nahed Miller      none noted    (X) * No evidence of bleeding    3/29/2022 1:32 PM EDT by Nahed Miller      none noted    (X) * No evidence of recurrent myocardial ischemia    3/29/2022 1:32 PM EDT by Nahed Miller      none present    ( ) * Dangerous arrhythmia absent    (X) * Vascular access site without evidence of infection, aneurysm, or growing hematoma    3/29/2022 1:32 PM EDT by Nahed Miller      none noted    (X) * Renal function at baseline or acceptable for next level of care    ( ) * Discharge plans and education understood    Activity    (X) * Ambulatory or acceptable for next level of care    3/29/2022 1:32 PM EDT by Kam Mari [address] [place] [address] “” [place]    (X) * Oral hydration    3/29/2022 1:32 PM EDT by Javan Lackey DIET; Regular; Low Fat/Low Chol/High Fiber/2 gm Na    (X) * Oral medications or regimen acceptable for next level of care    3/29/2022 1:32 PM EDT by Nahed Miller      see meds list    (X) * Oral diet or acceptable for next level of care    3/29/2022 1:32 PM EDT by Javan Lackey DIET;  Regular; Low Fat/Low Chol/High Fiber/2 gm Na    Medications    (X) * Anticoagulants absent    (X) Antiplatelet agents (eg, aspirin, clopidogrel, ticagrelor)    3/29/2022 1:32 PM EDT by Nahed Miller      aspirin tablet 325 mg  Dose: 325 mg  Freq: ONCE Route: PO    (X) Beta-blocker    3/29/2022 1:32 PM EDT by Nahed Miller      metoprolol succinate (TOPROL XL) extended release tablet 25 mg  Dose: 25 mg  Freq: DAILY Route: PO (X) Possible ACE inhibitor or ARB    3/29/2022 1:32 PM EDT by Veronica Hopkins      lisinopril (PRINIVIL;ZESTRIL) tablet 10 mg  Dose: 10 mg  Freq: DAILY Route: PO    (X) Statin    3/29/2022 1:32 PM EDT by Veronica Hopkins      rosuvastatin (CRESTOR) tablet 20 mg  Dose: 20 mg  Freq: DAILY Route: PO    * Milestone   Additional Notes   DATE: 03/15/2022         Pertinent Updates:   CVL done, still on KCl and Metoprolol Succinate, NORCO PRN         Vitals:   BP: 129/78   PA: 80   RR: 18   T: 98F   SpO2: 95%         Abnl/Pertinent Labs/Radiology/Diagnostic Studies:      3/15/2022 00:00   ABO Rh: O POS   Antibody Screen: NEG      3/15/2022 06:21   Magnesium: 2.3   WBC: 7.9   RBC: 4.07   Hemoglobin Quant: 11.7   Hematocrit: 35.7   MCV: 87.7   MCH: 28.7   MCHC: 32.8   MPV: 10.7   RDW: 14.6   Platelet Count: 255      3/15/2022 07:34   ECHOCARDIOGRAM TRANSESOPHAGEAL:  Summary    Ejection fraction is visually estimated at 20-25%.    Stretched PFO with L to R shunting by color flow Doppler.    Critical aortic stenosis is present.    Moderate aortic valve regurgitation.    Moderate central mitral regurgitation.    Mild tricuspid regurgitation. 3/15/2022 11:59   DIAGNOSTIC CARDIAC CATH LAB PROCEDURE: Procedure Completed         Physical Exam:   Heart:  reg   Lungs:  ctab   Abd: + bs soft nontender   Extrem:  W/o edema         MD Consults/Assessments & Plans:      IM Note:   Assessment:    Principal Problem: NSTEMI (non-ST elevated myocardial infarction) ([address]   Resolved Problems: * No resolved hospital problems. *   Plan:    Tooth extraction in am pain med valve procedure next week      Cardio Notes:   Procedure:     1.Left heart cath   Physician: Sara Perdomo DO. Indication: Aortic stenosis   AUC: 7   AUC indication: 70      Angiographic Results/findings:   Left Main: No angiographically significant stenosis   LAD: Mild diffuse luminal irregularities.  Distal diffuse 75 to 80% stenosis.    D1: Ostial 70% stenosis.  1.8 mm vessel. D2: 1.5 mm vessel.  No angiographically significant stenosis. Cx: No angiographically significant stenosis. OM1: Tiny vessel. OM2: Pending vessel. Ramus: Very large vessel with multiple branches.  Proximal and mid mild diffuse luminal irregularities. RCA: Dominant.  Proximal diffuse 70% stenosis. PDA: No angiographically significant stenosis. PLB: No angiographically significant stenosis. Cardio Notes 3:   NEPTALI and coronary angiogram reviewed. Moderate nonobstructive CAD   Severely reduced LVEF with moderate dilation.  Severe to critical aortic stenosis with heavy calcification and moderate AR.  Calcific mitral valve disease with 2-3+ MR. Medications:      aspirin tablet 325 mg   Dose: 325 mg   Freq: ONCE Route: PO      lisinopril (PRINIVIL;ZESTRIL) tablet 10 mg   Dose: 10 mg   Freq: DAILY Route: PO      metoprolol succinate (TOPROL XL) extended release tablet 25 mg   Dose: 25 mg   Freq: DAILY Route: PO      potassium chloride (KLOR-CON M) extended release tablet 20 mEq   Dose: 20 mEq   Freq: DAILY WITH BREAKFAST Route: PO      rosuvastatin (CRESTOR) tablet 20 mg   Dose: 20 mg   Freq: DAILY Route: PO      HYDROcodone-acetaminophen (NORCO) 5-325 MG per tablet 1 tablet   Dose: 1 tablet   Freq: EVERY 6 HOURS PRN Route: PO x 1 dose         Orders:   CBC, Glucose, ECHOCARDIOGRAM TRANSESOPHAGEAL   NPO to ADULT DIET; Regular; Low Fat/Low Chol/High Fiber/2 gm Na, Bedrest   continue KCl and Metoprolol Succinate, NORCO PRN         PT/OT/SLP/CM Assessments or Notes:      CM Notes:   Update CM note; Patient post NEPTALI/Cath. Plan is for possible TAVR on 3/23/22.  Will follow for readiness to discharge home.               Myocardial Infarction - Care Day 3 (3/14/2022) by Braeden Carroll RN       Review Status Review Entered   Completed 3/29/2022 13:23      Criteria Review      Care Day: 3 Care Date: 3/14/2022 Level of Care: Inpatient Floor    Guideline Day 3    Clinical Status    (X) * Hemodynamic stability    3/29/2022 1:23 PM EDT by Karo Gibson      BP: 121/58  CT: 78    (X) * Chest pain, dyspnea, or anginal equivalent absent    3/29/2022 1:23 PM EDT by Karo Gibson      absent    (X) * No evidence of bleeding    3/29/2022 1:23 PM EDT by Karo Gibson      none noted    (X) * No evidence of recurrent myocardial ischemia    3/29/2022 1:23 PM EDT by Karo Gibson      none noted    ( ) * Dangerous arrhythmia absent    (X) * Vascular access site without evidence of infection, aneurysm, or growing hematoma    3/29/2022 1:23 PM EDT by Karo Gibson      none noted    (X) * Renal function at baseline or acceptable for next level of care    3/29/2022 1:23 PM EDT by Killian Ray at baseline  GFR Non-: >60  GFR : >60    ( ) * Discharge plans and education understood    Activity    (X) * Ambulatory or acceptable for next level of care    3/29/2022 1:23 PM EDT by [address] “” [place]    (X) * Oral hydration    3/29/2022 1:23 PM EDT by Ximena Allred DIET; Regular; Low Fat/Low Chol/High Fiber/2 gm Na    (X) * Oral medications or regimen acceptable for next level of care    3/29/2022 1:23 PM EDT by Karo Gibson      see meds list    (X) * Oral diet or acceptable for next level of care    3/29/2022 1:23 PM EDT by Ximena Allred DIET;  Regular; Low Fat/Low Chol/High Fiber/2 gm Na    Medications    (X) * Anticoagulants absent    (X) Antiplatelet agents (eg, aspirin, clopidogrel, ticagrelor)    3/29/2022 1:23 PM EDT by Karo Gibson      aspirin EC tablet 81 mg  Dose: 81 mg  Freq: DAILY Route: PO    (X) Beta-blocker    3/29/2022 1:23 PM EDT by Karo Gibson      metoprolol succinate (TOPROL XL) extended release tablet 25 mg  Dose: 25 mg  Freq: DAILY Route: PO    (X) Possible ACE inhibitor or ARB    3/29/2022 1:23 PM EDT by Karo Gibson      lisinopril (PRINIVIL;ZESTRIL) tablet 10 mg  Dose: 10 mg  Freq: DAILY Route: PO    (X) Statin    3/29/2022 1:23 PM EDT by Connie Solorio      rosuvastatin (CRESTOR) tablet 20 mg  Dose: 20 mg  Freq: DAILY Route: PO    * Milestone   Additional Notes   DATE: 03/14/2022         Pertinent Updates:   Seen by dentist and cardio. Mag sulfate, KCl and Metoprolol started. Vitals:   BP: 121/58   CO: 78   RR: 18   T: 97.7F   SpO2: 94% - room air         Abnl/Pertinent Labs/Radiology/Diagnostic Studies:      3/14/2022 05:19   Sodium: 139   Potassium: 3.6   Chloride: 103   CO2: 27   BUN,BUNPL: 15   Creatinine: 0.7   Anion Gap: 9   GFR Non-: >60   GFR African American: >60   Magnesium: 1.8   GLUCOSE, FASTING,GF: 89   CALCIUM, SERUM, 856084: 8.5 (L)   Total Protein: 4.9 (L)   Albumin: 2.9 (L)   Alk Phos: 51   ALT: 115 (H)   AST: 79 (H)   Bilirubin: 0.5   WBC: 7.4   RBC: 4.09   Hemoglobin Quant: 11.6   Hematocrit: 35.1   MCV: 85.8   MCH: 28.4   MCHC: 33.0   MPV: 10.4   RDW: 14.5   Platelet Count: 394         Physical Exam:   Heart:  Reg with syst murmur   Lungs:  l base crackles   Abd: + bs soft nontender   Extrem:  No edema         MD Consults/Assessments & Plans:      IM Note:   Assessment:   Principal Problem: NSTEMI (non-ST elevated myocardial infarction) ([address]   Resolved Problems: * No resolved hospital problems. *   Plan: For tyron today      Dental Notes:    Resident Assessment and Plan:    1) Pt to return to dental clinic for ext #2 prior to valve surgery   2) Pt to return for SRP and Debridement post heart clearance - pt stated she will think whether to return here or a private dentist    3) Pt understands risks and benefits of procedure        Cardio Notes:   Assessment/Recommendations   1. Severe aortic stenosis   2. Severe cardiomyopathy   3. Moderately severe mitral regurgitation   4. Mild to moderate aortic regurgitation   5. Severe pulmonary hypertension   6. Hypertension   7. Right greater than left pleural effusion   8. Left bundle branch block   9. Well compensated today.    10. For TYRON and diagnostic cardiac catheterization tomorrow prior to valve intervention   11. Replace potassium and magnesium. Medications:      aspirin EC tablet 81 mg   Dose: 81 mg   Freq: DAILY Route: PO      lisinopril (PRINIVIL;ZESTRIL) tablet 10 mg   Dose: 10 mg   Freq: DAILY Route: PO      magnesium sulfate 2000 mg in 50 mL IVPB premix   Dose: 2,000 mg   Freq: ONCE Route: IV      metoprolol succinate (TOPROL XL) extended release tablet 25 mg   Dose: 25 mg   Freq: DAILY Route: PO      potassium chloride (KLOR-CON M) extended release tablet 20 mEq   Dose: 20 mEq   Freq: ONCE Route: PO      rosuvastatin (CRESTOR) tablet 20 mg   Dose: 20 mg   Freq: DAILY Route: PO      torsemide (DEMADEX) tablet 10 mg   Dose: 10 mg   Freq: DAILY Route: PO         Orders:   NPO to ADULT DIET; Regular; Low Fat/Low Chol/High Fiber/2 gm Na, Bedrest   CBC, CMP, GI-Liver Profile   start Mag Sulfate, KCl and Metoprolol Succinate, continues demadex and crestor   consult to dentist and cardio         PT/OT/SLP/CM Assessments or Notes:      CM Notes:   Transition of Care: patient admitted due to shortness of breath. She is alert and oriented. She resides with her spouse. She follows with Dr Duncan Thomson and her pharmacy is [facility]. She is scheduled for a NEPTALI. She is being worked up for a possible TAVR in the future. She will require dental work before the TAVR is scheduled. She will discharge to home and follow up as an outpatient for her preop TAVR needs.

## 2022-03-29 NOTE — PATIENT INSTRUCTIONS
Follow up on 4/26/2022 at 9:30 am for echocardiogram; call sooner if concerns arise in the meantime    Call Dr. Mickey Tobin ASAP re: constipation

## 2022-03-29 NOTE — PROGRESS NOTES
Structural Heart Clinic Note      Patient name: Kristie Mar    Reason for visit: TAVR follow up     Primary Care Physician: Arin King MD    Date of service: 3/29/2022    Chief Complaint: TAVR follow up - incision check    HPI: Mrs. Aidan Parry presents for follow up s/p TAVR on 3/23/22. She is doing great from procedure standpoint. She denies chest pain, sob/jean-baptiste, orthopnea, PND, LE edema or access site complications. She notes constipation with no BM for two weeks despite multiple stool softeners, laxatives, etc. She denies abdominal pain and her appetite is good. She also notes a bulge in the right groin that predates the TAVR procedure. She says it was previously noted in the right lower abdomen, and she was concerned about a possible hernia as she previously had one on the left. In the meantime, it seems to have \"moved down\" into the right groin. It is not painful. Allergies: No Known Allergies    Home medications:    Current Outpatient Medications   Medication Sig Dispense Refill    lisinopril (PRINIVIL;ZESTRIL) 20 MG tablet Take 1 tablet by mouth daily 30 tablet 1    rosuvastatin (CRESTOR) 20 MG tablet Take 1 tablet by mouth daily 30 tablet 0    metoprolol succinate (TOPROL XL) 25 MG extended release tablet Take 1 tablet by mouth daily 30 tablet 0    torsemide (DEMADEX) 10 MG tablet Take 1 tablet by mouth daily 30 tablet 0    potassium chloride (KLOR-CON M) 20 MEQ extended release tablet Take 1 tablet by mouth daily (with breakfast) 60 tablet 0    vitamin B-12 (CYANOCOBALAMIN) 100 MCG tablet Take 50 mcg by mouth daily      aspirin 81 MG EC tablet Take 81 mg by mouth daily       No current facility-administered medications for this visit.        Past Medical History:  Past Medical History:   Diagnosis Date    CAD (coronary artery disease) 03/15/2022    HFrEF (heart failure with reduced ejection fraction) ([facility] 03/12/2022    Hypertension     Nonrheumatic aortic valve stenosis 03/12/2022 Critical    NSTEMI (non-ST elevated myocardial infarction) [facility] 03/11/2022    Pleural effusion 03/12/2022    Bilat       Past Surgical History:  Past Surgical History:   Procedure Laterality Date    AORTIC VALVE REPLACEMENT N/A 3/23/2022    TRANSCATHETER AORTIC VALVE REPLACEMENT performed by Daniele Corona MD at [facility]  03/15/2022    Dr. Amy Bolanos, TOTAL ABDOMINAL      JOINT REPLACEMENT  03/12/2019    left knee    TRANSESOPHAGEAL ECHOCARDIOGRAM  03/15/2022    Dr Waylon Singh       Social History:  Social History     Socioeconomic History    Marital status:      Spouse name: Not on file    Number of children: Not on file    Years of education: Not on file    Highest education level: Not on file   Occupational History    Occupation: CNA   Tobacco Use    Smoking status: Never Smoker    Smokeless tobacco: Never Used   Vaping Use    Vaping Use: Never used   Substance and Sexual Activity    Alcohol use: Not Currently    Drug use: Never    Sexual activity: Not on file   Other Topics Concern    Not on file   Social History Narrative    Not on file     Social Determinants of Health     Financial Resource Strain: Low Risk     Difficulty of Paying Living Expenses: Not hard at all   Food Insecurity: No Food Insecurity    Worried About [facility] in the Last Year: Never true    [address] [facility] [address] in the Last Year: Never true   Transportation Needs:     Lack of Transportation (Medical): Not on file    Lack of Transportation (Non-Medical):  Not on file   Physical Activity:     Days of Exercise per Week: Not on file    Minutes of Exercise per Session: Not on file   Stress:     Feeling of Stress : Not on file   Social Connections:     Frequency of Communication with Friends and Family: Not on file    Frequency of Social Gatherings with Friends and Family: Not on file    Attends Baptism Services: Not on file   [employer] or Organizations: Not on file    Attends Club or Organization Meetings: Not on file    Marital Status: Not on file   Intimate Partner Violence:     Fear of Current or Ex-Partner: Not on file    Emotionally Abused: Not on file    Physically Abused: Not on file    Sexually Abused: Not on file   Housing Stability:     Unable to Pay for Housing in the Last Year: Not on file    Number of [place] in the Last Year: Not on file    Unstable Housing in the Last Year: Not on file       Family History:  Family History   Problem Relation Age of Onset    Stroke Mother     Heart Attack Father        Review of Systems:  Constitutional: Denies fevers, chills, or weight loss. HEENT: Denies visual changes or hearing loss. Heart: As per HPI. Lungs: Denies shortness of breath, cough, or wheezing. Gastrointestinal: Denies nausea, vomiting, constipation, or diarrhea. Genitourinary: dysuria or hematuria. Psychiatric: Patient denies anxiety or depression. Neurologic: Patient denies weakness of the extremities, dizziness, or headaches. All other ROS checked and found to be negative. Objective:  /65  P 62  R 16  T 98.2  General Appearance: Pleasant 67y.o. year old female who appears stated age. Communicates well, no acute distress. HEENT: Head is normocephalic, atraumatic. EOMs intact, PERRL. Trachea midline. Lungs: Normal respiratory rate and normal effort. She is not in respiratory distress. Breath sounds clear to auscultation. No wheezes. Heart: Normal rate. Regular rhythm. S1 normal and S2 normal. No murmur. Chest: Symmetric chest wall expansion. Extremities: Normal range of motion. No edema. Neurological: Patient is alert and oriented to person, place and time. Skin: Warm and dry. Abdomen: Abdomen is soft and non-distended. Bowel sounds are normal. There is no abdominal tenderness tenderness. There is no guarding. There is no mass. Pulses: Distal pulses are intact.   Skin: Warm and dry without lesions. Groins: bilateral femoral access sites soft, non tender with minimal, resolving ecchymosis. No erythema, hematoma or drainage. Soft, non tender mass (approx 2cm) noted above right femoral access site (patient states was present prior to TAVR)      Assessment:   Patient Active Problem List   Diagnosis    Hypertension    NSTEMI (non-ST elevated myocardial infarction) ([address]    Nodular calcific aortic valve stenosis       1. Critical aortic stenosis s/p TAVR using #23 S3 Ultra valve  2. Acute HFrEF, EF 20%  3. CAD - 80% distal LAD; otherwise moderate disease  4. HTN  5. HLD  6. LBBB  7. constipation     NYHA class IV preop        Plan:   1. Follow up with PCP re: constipation and groin mass - will obtain u/s in the meantime to r/o pseudoaneurysm  2. Follow up for 30 day echo 4/26/22; sooner PRN  3. Increase lisinopril to 20 mg daily  4.  Repeat CBC to reassess WBC (elevated POD #1)      Electronically signed by Kirit Delacruz PA-C on 3/29/2022 at 11:22 AM

## 2022-03-30 ENCOUNTER — HOSPITAL ENCOUNTER (OUTPATIENT)
Dept: ULTRASOUND IMAGING | Age: 77
Discharge: HOME | End: 2022-04-01
Payer: MEDICARE

## 2022-03-30 DIAGNOSIS — R19.09 RIGHT GROIN MASS: ICD-10-CM

## 2022-03-30 DIAGNOSIS — Z48.812 ENCOUNTER FOR SURGICAL AFTERCARE FOLLOWING SURGERY ON THE CIRCULATORY SYSTEM: ICD-10-CM

## 2022-03-30 DIAGNOSIS — Z95.2 S/P TAVR (TRANSCATHETER AORTIC VALVE REPLACEMENT): ICD-10-CM

## 2022-03-30 DIAGNOSIS — T81.718A PSEUDOANEURYSM FOLLOWING PROCEDURE (HCC): ICD-10-CM

## 2022-03-30 DIAGNOSIS — I72.9 PSEUDOANEURYSM FOLLOWING PROCEDURE (HCC): ICD-10-CM

## 2022-03-30 PROCEDURE — 93926 LOWER EXTREMITY STUDY: CPT

## 2022-04-25 NOTE — DISCHARGE SUMMARY
[address]                  [address] [facility],  [facility]                               DISCHARGE SUMMARY    PATIENT NAME: Sonny Collado                     :        1945  MED REC NO:   [id number]                            ROOM:       3287  ACCOUNT NO:   [de-identified]                           ADMIT DATE: 2022  PROVIDER:     Nelson Peña DO                  DISCHARGE DATE:  2022    DISCHARGE DIAGNOSES:  1. Acute non-ST elevation MI.  2.  Transaminitis. 3.  Acute systolic CHF. 4.  Valvular heart disease. 5.  Severe AS. 6.  Left bundle-branch block. 7.  Hypertension. 8.  Coronary artery disease. HOSPITAL COURSE:  The patient is a 62-year-old female who presented to  the hospital with shortness of breath. She was felt to have acute CHF. She was seen by Cardiology, Cardiothoracic Surgery, and Dentistry. NEPTALI  revealed severe LV dysfunction, critical AS, moderate AR, moderate MR,  mild TR. Heart catheterization revealed coronary artery disease. The  patient had tooth extraction during the hospital stay and was in an  evaluation to have her surgery initiated with Cardiothoracic Surgery. The patient was discharged to home in stable condition on 2022  with plans for followup for valve procedure in the near future. DISCHARGE CONDITION:  At the time of discharge, stable. DISCHARGE MEDICATIONS:  As per discharge med rec which include,  1. Toprol-XL. 2.  Potassium chloride. 3.  Crestor. 4.  Torsemide. 5.  Aspirin. 6.  Vitamin B12. DISCHARGE INSTRUCTION:  The patient was instructed to follow up with  Cardiothoracic Surgery and Cardiology, call office for appointment.         Lacey Crocker DO    D: 2022 13:34:52       T: 2022 13:37:05     [email or web address]  Job#: [id number]     Doc#: [id number]    CC:

## 2022-04-26 ENCOUNTER — OFFICE VISIT (OUTPATIENT)
Dept: CARDIOLOGY CLINIC | Age: 77
End: 2022-04-26
Payer: MEDICARE

## 2022-04-26 ENCOUNTER — HOSPITAL ENCOUNTER (OUTPATIENT)
Dept: CARDIOLOGY | Age: 77
Discharge: HOME | End: 2022-04-26
Payer: MEDICARE

## 2022-04-26 VITALS
HEART RATE: 60 BPM | HEIGHT: 55 IN | WEIGHT: 104 LBS | BODY MASS INDEX: 24.07 KG/M2 | SYSTOLIC BLOOD PRESSURE: 190 MMHG | TEMPERATURE: 98.5 F | DIASTOLIC BLOOD PRESSURE: 70 MMHG

## 2022-04-26 DIAGNOSIS — Z95.2 S/P TAVR (TRANSCATHETER AORTIC VALVE REPLACEMENT): Primary | ICD-10-CM

## 2022-04-26 LAB
LV EF: 33 %
LVEF MODALITY: NORMAL

## 2022-04-26 PROCEDURE — 1123F ACP DISCUSS/DSCN MKR DOCD: CPT | Performed by: PHYSICIAN ASSISTANT

## 2022-04-26 PROCEDURE — 4040F PNEUMOC VAC/ADMIN/RCVD: CPT | Performed by: PHYSICIAN ASSISTANT

## 2022-04-26 PROCEDURE — 99213 OFFICE O/P EST LOW 20 MIN: CPT | Performed by: PHYSICIAN ASSISTANT

## 2022-04-26 PROCEDURE — G8420 CALC BMI NORM PARAMETERS: HCPCS | Performed by: PHYSICIAN ASSISTANT

## 2022-04-26 PROCEDURE — 1036F TOBACCO NON-USER: CPT | Performed by: PHYSICIAN ASSISTANT

## 2022-04-26 PROCEDURE — 1090F PRES/ABSN URINE INCON ASSESS: CPT | Performed by: PHYSICIAN ASSISTANT

## 2022-04-26 PROCEDURE — G8428 CUR MEDS NOT DOCUMENT: HCPCS | Performed by: PHYSICIAN ASSISTANT

## 2022-04-26 PROCEDURE — G8400 PT W/DXA NO RESULTS DOC: HCPCS | Performed by: PHYSICIAN ASSISTANT

## 2022-04-26 PROCEDURE — 93306 TTE W/DOPPLER COMPLETE: CPT

## 2022-04-26 RX ORDER — LISINOPRIL 30 MG/1
30 TABLET ORAL DAILY
Qty: 30 TABLET | Refills: 1 | Status: SHIPPED | OUTPATIENT
Start: 2022-04-26

## 2022-04-26 NOTE — PROGRESS NOTES
[facility] Clinic Note      Patient name: Nikky Neal    Reason for visit: TAVR follow up     Primary Care Physician: Mariaelena Mckeon MD    Date of service: 4/26/2022    Chief Complaint: TAVR follow up - 30 day    HPI: Mrs. Nathalie Garcia presents for follow up s/p TAVR on 3/23/22. She is still doing great from procedure standpoint. She denies chest pain, sob/jean-baptiste, orthopnea, PND, LE edema or access site complications. She is walking for exercise without difficulty. Allergies: No Known Allergies    Home medications:    Current Outpatient Medications   Medication Sig Dispense Refill    lisinopril (PRINIVIL;ZESTRIL) 30 MG tablet Take 1 tablet by mouth daily 30 tablet 1    rosuvastatin (CRESTOR) 20 MG tablet Take 1 tablet by mouth daily 30 tablet 0    metoprolol succinate (TOPROL XL) 25 MG extended release tablet Take 1 tablet by mouth daily 30 tablet 0    torsemide (DEMADEX) 10 MG tablet Take 1 tablet by mouth daily 30 tablet 0    potassium chloride (KLOR-CON M) 20 MEQ extended release tablet Take 1 tablet by mouth daily (with breakfast) 60 tablet 0    vitamin B-12 (CYANOCOBALAMIN) 100 MCG tablet Take 50 mcg by mouth daily      aspirin 81 MG EC tablet Take 81 mg by mouth daily       No current facility-administered medications for this visit.      Facility-Administered Medications Ordered in Other Visits   Medication Dose Route Frequency Provider Last Rate Last Admin    perflutren lipid microspheres (DEFINITY) injection 1.65 mg  1.5 mL IntraVENous ONCE PRN MADYSON Alatorre           Past Medical History:  Past Medical History:   Diagnosis Date    CAD (coronary artery disease) 03/15/2022    HFrEF (heart failure with reduced ejection fraction) ([address] 03/12/2022    Hypertension     Nonrheumatic aortic valve stenosis 03/12/2022    Critical    NSTEMI (non-ST elevated myocardial infarction) ([facility] 03/11/2022    Pleural effusion 03/12/2022    Bilat       Past Surgical History:  Past Surgical History:   Procedure Laterality Date    AORTIC VALVE REPLACEMENT N/A 3/23/2022    TRANSCATHETER AORTIC VALVE REPLACEMENT performed by Sergio Medeiros MD at [address]  03/15/2022    Dr. Ginger Bender, TOTAL ABDOMINAL      JOINT REPLACEMENT  03/12/2019    left knee    TRANSESOPHAGEAL ECHOCARDIOGRAM  03/15/2022    Dr Wendy Aylaa       Social History:  Social History     Socioeconomic History    Marital status:      Spouse name: Not on file    Number of children: Not on file    Years of education: Not on file    Highest education level: Not on file   Occupational History    Occupation: CNA   Tobacco Use    Smoking status: Never Smoker    Smokeless tobacco: Never Used   Vaping Use    Vaping Use: Never used   Substance and Sexual Activity    Alcohol use: Not Currently    Drug use: Never    Sexual activity: Not on file   Other Topics Concern    Not on file   Social History Narrative    Not on file     Social Determinants of Health     Financial Resource Strain: Low Risk     Difficulty of Paying Living Expenses: Not hard at all   Food Insecurity: No Food Insecurity    Worried About [facility] in the Last Year: Never true    [address] [facility] [address] in the Last Year: Never true   Transportation Needs:     Lack of Transportation (Medical): Not on file    Lack of Transportation (Non-Medical):  Not on file   Physical Activity:     Days of Exercise per Week: Not on file    Minutes of Exercise per Session: Not on file   Stress:     Feeling of Stress : Not on file   Social Connections:     Frequency of Communication with Friends and Family: Not on file    Frequency of Social Gatherings with Friends and Family: Not on file    Attends [employer] Services: Not on file    Active Member of Clubs or Organizations: Not on file    Attends Club or Organization Meetings: Not on file    Marital Status: Not on file   Intimate Partner Violence:     Fear of Current or Ex-Partner: Not on file    Emotionally Abused: Not on file    Physically Abused: Not on file    Sexually Abused: Not on file   Housing Stability:     Unable to Pay for Housing in the Last Year: Not on file    Number of Places Lived in the Last Year: Not on file    Unstable Housing in the Last Year: Not on file       Family History:  Family History   Problem Relation Age of Onset    Stroke Mother     Heart Attack Father        Review of Systems:  Constitutional: Denies fevers, chills, or weight loss. HEENT: Denies visual changes or hearing loss. Heart: As per HPI. Lungs: Denies shortness of breath, cough, or wheezing. Gastrointestinal: Denies nausea, vomiting, constipation, or diarrhea. Genitourinary: dysuria or hematuria. Psychiatric: Patient denies anxiety or depression. Neurologic: Patient denies weakness of the extremities, dizziness, or headaches. All other ROS checked and found to be negative. Objective:  /70  P 60  R 16  T 98.5  General Appearance: Pleasant 67y.o. year old female who appears stated age. Communicates well, no acute distress. HEENT: Head is normocephalic, atraumatic. EOMs intact, PERRL. Trachea midline. Lungs: Normal respiratory rate and normal effort. She is not in respiratory distress. Breath sounds clear to auscultation. No wheezes. Heart: Normal rate. Regular rhythm. S1 normal and S2 normal. No murmur. Chest: Symmetric chest wall expansion. Extremities: Normal range of motion. No edema. Neurological: Patient is alert and oriented to person, place and time. Skin: Warm and dry. Abdomen: Abdomen is soft and non-distended. Bowel sounds are normal.   Pulses: Distal pulses are intact. Skin: Warm and dry without lesions.         Assessment:   Patient Active Problem List   Diagnosis    Hypertension    NSTEMI (non-ST elevated myocardial infarction) [address] [facility]    Nodular calcific aortic valve stenosis    S/P TAVR (transcatheter aortic valve replacement)       1. Critical aortic stenosis s/p TAVR using #23 S3 Ultra valve   A. Mean gradient 16 mmHg, no PVL (POD #1)   B. Mean gradient 12 mmHg (30 days)  2. Acute HFrEF, EF 20%  3. CAD - 80% distal LAD; otherwise moderate disease  4. HTN  5. HLD  6. LBBB  7. constipation     NYHA class I currently        Plan:   1. Follow up for one-year echo 3/21/2023; sooner PRN  2. Increase lisinopril to 30 mg daily; check BP at home and will call with progress report next week  3. Follow up with  Dr. Thang Tony as scheduled  4.  SBE prophylaxis reviewed      Electronically signed by Bari Hodges PA-C on 4/26/2022 at 1:53 PM

## 2022-04-26 NOTE — PATIENT INSTRUCTIONS
Increase lisinopril to 30 mg daily; take BP at home and [place] will call for progress report next week    Follow up on 3/21/2023 at 10:30 am for echocardiogram; sooner if concerns arise in the meantime    Reminder: antibiotic required prior to dental appointments

## 2022-05-10 ENCOUNTER — TELEPHONE (OUTPATIENT)
Dept: CARDIOLOGY | Age: 77
End: 2022-05-10

## 2022-05-10 DIAGNOSIS — D72.829 LEUKOCYTOSIS, UNSPECIFIED TYPE: Primary | ICD-10-CM

## 2022-05-10 DIAGNOSIS — I10 PRIMARY HYPERTENSION: ICD-10-CM

## 2022-05-10 NOTE — TELEPHONE ENCOUNTER
Patient called to report an episode of chest discomfort that occurred approximately one hour ago. She describes it as a \"short pain\" that lasted only a few minutes with no other associated symptoms. It has never happened before. She also notes feeling \"dizzy\" early this morning - states she felt unsteady but not near syncopal. No chest pain or palpitations at that time. She took her BP this morning and believes it was 777 systolic. She has not really been monitoring as instructed at last visit. Advised to call if chest discomfort recurs. Continue current meds. She will have labs drawn as discussed previously on new dose of lisinopril. Will discuss timing of follow up with Dr. Ap Hayes as well. She states understanding.

## 2022-05-11 ENCOUNTER — HOSPITAL ENCOUNTER (OUTPATIENT)
Age: 77
Discharge: HOME | End: 2022-05-11
Payer: MEDICARE

## 2022-05-11 DIAGNOSIS — D72.829 LEUKOCYTOSIS, UNSPECIFIED TYPE: ICD-10-CM

## 2022-05-11 DIAGNOSIS — I10 PRIMARY HYPERTENSION: ICD-10-CM

## 2022-05-11 LAB
ANION GAP SERPL CALCULATED.3IONS-SCNC: 11 MMOL/L (ref 7–16)
BUN BLDV-MCNC: 17 MG/DL (ref 6–23)
CALCIUM SERPL-MCNC: 9.5 MG/DL (ref 8.6–10.2)
CHLORIDE BLD-SCNC: 98 MMOL/L (ref 98–107)
CO2: 32 MMOL/L (ref 22–29)
CREAT SERPL-MCNC: 0.9 MG/DL (ref 0.5–1)
GFR AFRICAN AMERICAN: >60
GFR NON-AFRICAN AMERICAN: >60 ML/MIN/1.73
GLUCOSE BLD-MCNC: 122 MG/DL (ref 74–99)
HCT VFR BLD CALC: 39.1 % (ref 34–48)
HEMOGLOBIN: 12.8 G/DL (ref 11.5–15.5)
MCH RBC QN AUTO: 27.6 PG (ref 26–35)
MCHC RBC AUTO-ENTMCNC: 32.7 % (ref 32–34.5)
MCV RBC AUTO: 84.4 FL (ref 80–99.9)
PDW BLD-RTO: 14.2 FL (ref 11.5–15)
PLATELET # BLD: 238 E9/L (ref 130–450)
PMV BLD AUTO: 9.7 FL (ref 7–12)
POTASSIUM SERPL-SCNC: 3.2 MMOL/L (ref 3.5–5)
RBC # BLD: 4.63 E12/L (ref 3.5–5.5)
SODIUM BLD-SCNC: 141 MMOL/L (ref 132–146)
WBC # BLD: 8.7 E9/L (ref 4.5–11.5)

## 2022-05-11 PROCEDURE — 80048 BASIC METABOLIC PNL TOTAL CA: CPT

## 2022-05-11 PROCEDURE — 85027 COMPLETE CBC AUTOMATED: CPT

## 2022-05-11 PROCEDURE — 36415 COLL VENOUS BLD VENIPUNCTURE: CPT

## 2022-05-20 RX ORDER — LISINOPRIL 20 MG/1
TABLET ORAL
Qty: 90 TABLET | Refills: 3 | Status: SHIPPED | OUTPATIENT
Start: 2022-05-20

## 2022-11-15 ENCOUNTER — TELEPHONE (OUTPATIENT)
Dept: ADMINISTRATIVE | Age: 77
End: 2022-11-15

## 2022-11-15 NOTE — TELEPHONE ENCOUNTER
Prior TAVR and Dr. Fabricio Baca pt calling. She has no future f/us scheduled with Cardiology - Home [facility] calling to check on apt status/scheduling. Please call pt with apt information/schedule. Thank you. Appears she needed a 3 mth in March 2023 with Mary - unsure re: Cardiology apt.

## 2022-11-23 ENCOUNTER — OFFICE VISIT (OUTPATIENT)
Dept: CARDIOLOGY CLINIC | Age: 77
End: 2022-11-23
Payer: MEDICARE

## 2022-11-23 VITALS
HEART RATE: 51 BPM | BODY MASS INDEX: 24.99 KG/M2 | RESPIRATION RATE: 16 BRPM | WEIGHT: 108 LBS | SYSTOLIC BLOOD PRESSURE: 130 MMHG | HEIGHT: 55 IN | DIASTOLIC BLOOD PRESSURE: 64 MMHG | OXYGEN SATURATION: 99 %

## 2022-11-23 DIAGNOSIS — I25.10 CORONARY ARTERY DISEASE INVOLVING NATIVE CORONARY ARTERY OF NATIVE HEART WITHOUT ANGINA PECTORIS: ICD-10-CM

## 2022-11-23 DIAGNOSIS — E78.2 MIXED HYPERLIPIDEMIA: ICD-10-CM

## 2022-11-23 DIAGNOSIS — I10 PRIMARY HYPERTENSION: ICD-10-CM

## 2022-11-23 DIAGNOSIS — Z95.2 S/P TAVR (TRANSCATHETER AORTIC VALVE REPLACEMENT): Primary | ICD-10-CM

## 2022-11-23 PROCEDURE — 3074F SYST BP LT 130 MM HG: CPT | Performed by: INTERNAL MEDICINE

## 2022-11-23 PROCEDURE — 1123F ACP DISCUSS/DSCN MKR DOCD: CPT | Performed by: INTERNAL MEDICINE

## 2022-11-23 PROCEDURE — 99214 OFFICE O/P EST MOD 30 MIN: CPT | Performed by: INTERNAL MEDICINE

## 2022-11-23 PROCEDURE — 1090F PRES/ABSN URINE INCON ASSESS: CPT | Performed by: INTERNAL MEDICINE

## 2022-11-23 PROCEDURE — 93000 ELECTROCARDIOGRAM COMPLETE: CPT | Performed by: INTERNAL MEDICINE

## 2022-11-23 PROCEDURE — G8400 PT W/DXA NO RESULTS DOC: HCPCS | Performed by: INTERNAL MEDICINE

## 2022-11-23 PROCEDURE — 1036F TOBACCO NON-USER: CPT | Performed by: INTERNAL MEDICINE

## 2022-11-23 PROCEDURE — G8484 FLU IMMUNIZE NO ADMIN: HCPCS | Performed by: INTERNAL MEDICINE

## 2022-11-23 PROCEDURE — 3078F DIAST BP <80 MM HG: CPT | Performed by: INTERNAL MEDICINE

## 2022-11-23 PROCEDURE — G8427 DOCREV CUR MEDS BY ELIG CLIN: HCPCS | Performed by: INTERNAL MEDICINE

## 2022-11-23 PROCEDURE — G8417 CALC BMI ABV UP PARAM F/U: HCPCS | Performed by: INTERNAL MEDICINE

## 2022-11-23 NOTE — PATIENT INSTRUCTIONS
Continue all your medications at current doses. Please check blood work (Lipid panel)  Restrict sodium intake to less than 2-2.5 g/day. Restrict fluid intake to less than 2.2 L/day. Goal BP is less than 130/80. If your weight increases by > 2 lbs in a day or >5 lbs in more than a day, take an extra lasix pill. Please try to exercise for 150 minutes a week. I will see you back in the office in 6 months. Please call the office at ([phone], option [phone]) if you have any questions.

## 2022-11-23 NOTE — PROGRESS NOTES
CHIEF COMPLAINT:   Chief Complaint   Patient presents with    Valvular Heart Disease     Had TAVR in March 2022 per Dr. Javier Vasquez. Has a small lump in L antecubital that has been there since since the TAVR. Has stayed about the same size. Tender at times when it is touched. mild dizziness upon rising. Denies other cardiac complaints. HISTORY OF PRESENT ILLNESS: Patient is a 65 y.o. female who is here for a follow up visit with me. She has a history of aortic stenosis status post TAVR in March of this year, hypertension, hyperlipidemia, coronary artery disease. She underwent work-up as outlined below and subsequently underwent TAVR implantation. She tolerated well. She reports functional improvement since the procedure. She has not had any further visits to the hospital or emergency room. At today's office visit, She  denies any chest pain, shortness of breath, palpitations, dizziness, pedal edema. The patient is capable of activities of daily living. There is dyspnea on more than moderate exertion. There is no orthopnea or PND. She is compliant with medications as well as diet and exercise regimen. Prior Cardiac workup:  TAVR CTAs:  Valve morphology: Trileaflet valve with severe leaflet calcification and protruding calcium during the SCJ. No significant LVOT calcification.   Annular area: 407  Annular perimeter: 72  Annular dimensions: 20.5x26.5, area-derived diameter 22.8  LVOT dimensions: area 446, perimeter 77, 20.4x29.0 at -4mm  Sinus of Valsalva dimensions: L31.2, R28.1, N27.5  STJ height: 19.6  STJ dimensions: 19.7x26.9 (eccentric protruding calcium nodule)  Left coronary height: 10.7  Right coronary height: 17  Annular angle to the horizontal plane: 32  Implant angle: LAO15/CAU3     Right iliofemoral: Adequate  Left iliofemoral: Adequate     Aortic arch: severe brachiocephalic tortuosity     Conclusions: Anatomy is amenable to TAVR with a 23 mm EUSEBIO 3 Ultra via percutaneous right femoral access under MAC and TTE with caution regarding the STJ calcium     Coronary angiography 3/15/22:  Left Main: No angiographically significant stenosis  LAD: Mild diffuse luminal irregularities. Distal diffuse 75 to 80% stenosis. D1: Ostial 70% stenosis. 1.8 mm vessel. D2: 1.5 mm vessel. No angiographically significant stenosis. Cx: No angiographically significant stenosis. OM1: Tiny vessel. OM2: Pending vessel. Ramus: Very large vessel with multiple branches. Proximal and mid mild diffuse luminal irregularities. RCA: Dominant. Proximal diffuse 70% stenosis. PDA: No angiographically significant stenosis. PLB: No angiographically significant stenosis     NEPTALI 3/15/22:   Ejection fraction is visually estimated at 20-25%. Stretched PFO with L to R shunting by color flow Doppler. Critical aortic stenosis is present. Moderate aortic valve regurgitation. Moderate central mitral regurgitation. Mild tricuspid regurgitation. Echocardiogram from 4/27/2022: EF is 30 to 35%. Severe eccentric hypertrophy. Severe mitral annular calcification. Normal RV function. Mild mitral regurgitation. TAVR valve appears well-seated with an acceptable gradient and function. Estimated PA pressure. Small circumferential pericardial effusion noted.       Past Medical History:   Diagnosis Date    CAD (coronary artery disease) 03/15/2022    HFrEF (heart failure with reduced ejection fraction) ([facility] [address] 03/12/2022    Hypertension     Nonrheumatic aortic valve stenosis 03/12/2022    Critical    NSTEMI (non-ST elevated myocardial infarction) ([facility] [address] 03/11/2022    Pleural effusion 03/12/2022    Bilat       No Known Allergies    Current Outpatient Medications   Medication Sig Dispense Refill    lisinopril (PRINIVIL;ZESTRIL) 30 MG tablet Take 1 tablet by mouth daily 30 tablet 1    rosuvastatin (CRESTOR) 20 MG tablet Take 1 tablet by mouth daily 30 tablet 0    metoprolol succinate (TOPROL XL) 25 MG extended release tablet Take 1 tablet by mouth daily 30 tablet 0    torsemide (DEMADEX) 10 MG tablet Take 1 tablet by mouth daily 30 tablet 0    potassium chloride (KLOR-CON M) 20 MEQ extended release tablet Take 1 tablet by mouth daily (with breakfast) 60 tablet 0    vitamin B-12 (CYANOCOBALAMIN) 100 MCG tablet Take 50 mcg by mouth daily      aspirin 81 MG EC tablet Take 81 mg by mouth daily      lisinopril (PRINIVIL;ZESTRIL) 20 MG tablet TAKE ONE TABLET BY MOUTH ONCE DAILY 90 tablet 3     No current facility-administered medications for this visit. Social History     Socioeconomic History    Marital status:      Spouse name: Not on file    Number of children: Not on file    Years of education: Not on file    Highest education level: Not on file   Occupational History    Occupation: CNA   Tobacco Use    Smoking status: Never    Smokeless tobacco: Never   Vaping Use    Vaping Use: Never used   Substance and Sexual Activity    Alcohol use: Not Currently    Drug use: Never    Sexual activity: Not on file   Other Topics Concern    Not on file   Social History Narrative    1 cup coffee daily      Social Determinants of Health     Financial Resource Strain: Low Risk     Difficulty of Paying Living Expenses: Not hard at all   Food Insecurity: No Food Insecurity    Worried About Running Out of Food in the Last Year: Never true    Ran Out of Food in the Last Year: Never true   Transportation Needs: Not on file   Physical Activity: Not on file   Stress: Not on file   Social Connections: Not on file   Intimate Partner Violence: Not on file   Housing Stability: Not on file       Family History   Problem Relation Age of Onset    Stroke Mother     Heart Attack Father        Review of Systems  Constitutional: Negative for fever, malaise/fatigue and weight loss. HENT: Negative for sore throat and tinnitus. Eyes: Negative for blurred vision and double vision. Respiratory: Negative for shortness of breath. Negative for cough and wheezing. Cardiovascular: As mentioned in HPI. Gastrointestinal: Negative for abdominal pain, heartburn, nausea and vomiting. Genitourinary: Negative. Musculoskeletal: Negative for back pain, joint pain and myalgias. Neurological: Negative for dizziness, tremors, loss of consciousness and headaches. Endo/Heme/Allergies: Negative. Psychiatric/Behavioral: Negative for depression and suicidal ideas. Physical Exam   /64 (Site: Left Upper Arm, Position: Sitting, Cuff Size: Medium Adult)   Pulse 51   Resp 16   Ht 4' 7\" (1.397 m)   Wt 108 lb (49 kg)   SpO2 99%   BMI 25.10 kg/m²   Constitutional: Oriented to person, place, and time. Well-developed and well-nourished. No distress. Head: Normocephalic and atraumatic. Eyes: EOM are normal. Pupils are equal, round, and reactive to light. Neck: Normal range of motion. Neck supple. No hepatojugular reflux and no JVD present. Carotid bruit is not present. No tracheal deviation present. No thyromegaly present. Cardiovascular: Normal rate, regular rhythm, normal heart sounds and intact distal pulses. Exam reveals no gallop and no friction rub. No murmur heard. Pulmonary/Chest: Effort normal and breath sounds normal. No respiratory distress. No wheezes. No rales. No tenderness. Abdominal: Soft. Bowel sounds are normal. No distension and no mass. No tenderness. No rebound and no guarding. Musculoskeletal: Normal range of motion. No edema and no tenderness. Lymphadenopathy:   No cervical adenopathy. Neurological: Alert and oriented to person, place, and time. Skin: Skin is warm and dry. No rash noted. Not diaphoretic. No erythema. Psychiatric: Normal mood and affect. Behavior is normal.     Procedures and Testing  EKG: Done in the office today and reviewed by me: Normal sinus rhythm. First-degree AV delay with a MO interval of 236 ms. Left bundle branch block. ASSESSMENT:   Diagnosis Orders   1.  S/P TAVR (transcatheter aortic valve replacement)  EKG 12 lead      2. Primary hypertension        3. Coronary artery disease involving native coronary artery of native heart without angina pectoris        4. Mixed hyperlipidemia  Lipid Panel            Plan:  1. Aortic stenosis status post TAVR: Echocardiogram from April of this year as above. The valve appears well-seated with acceptable function. She will have a follow-up echocardiogram in March or April of next year. She overall appears stable and euvolemic. She is currently on aspirin 81 mg daily. 2.  Mixed ischemic and nonischemic cardiomyopathy: EF is about 40%. She appears euvolemic on examination. She is currently on lisinopril 30, Toprol-XL 25 mg daily. She is also on Demadex 10 mg daily. Repeat echocardiogram as above to assess candidacy for Aldactone as well as defibrillator therapy. 3.  Coronary artery disease: Moderate to severe distal disease. Appears asymptomatic at this time. On aspirin as above. She is also on Crestor and Toprol as mentioned above. 4.  Hypertension: Pressure is at goal in the office today. Goal for her is less than 130/80. Salt restriction counseling provided. 5.  Hyperlipidemia: Lipid panel from January of this year showed an LDL of 147. She is currently on Crestor as described above. I will recheck a lipid panel for risk stratification. Follow-up with me in 6 months. Nancy Rico MD, [facility] Cardiology     NOTE: This report was transcribed using voice recognition software. Every effort was made to ensure accuracy; however, inadvertent computerized transcription errors may be present.

## 2022-11-25 ENCOUNTER — HOSPITAL ENCOUNTER (OUTPATIENT)
Age: 77
Discharge: HOME | End: 2022-11-25
Payer: MEDICARE

## 2022-11-25 DIAGNOSIS — E78.2 MIXED HYPERLIPIDEMIA: ICD-10-CM

## 2022-11-25 LAB
CHOLESTEROL, TOTAL: 145 MG/DL (ref 0–199)
HDLC SERPL-MCNC: 83 MG/DL
LDL CHOLESTEROL CALCULATED: 48 MG/DL (ref 0–99)
TRIGL SERPL-MCNC: 72 MG/DL (ref 0–149)
VLDLC SERPL CALC-MCNC: 14 MG/DL

## 2022-11-25 PROCEDURE — 80061 LIPID PANEL: CPT

## 2022-11-25 PROCEDURE — 36415 COLL VENOUS BLD VENIPUNCTURE: CPT

## 2023-02-09 NOTE — PROGRESS NOTES
In CVL atorvastatin 20 mg oral tablet: 1 tab(s) orally once a day (at bedtime)  FeroSul 325 mg (65 mg elemental iron) oral tablet: 1 tab(s) orally once a day   furosemide 20 mg oral tablet: 1 tab(s) orally once a day  Incruse Ellipta 62.5 mcg/inh inhalation powder: 1 puff(s) inhaled every 24 hours  lisinopril 20 mg oral tablet: 1 tab(s) orally once a day  Plavix 75 mg oral tablet: 1 tab(s) orally once a day x 30 days    atorvastatin 20 mg oral tablet: 1 tab(s) orally once a day (at bedtime)  FeroSul 325 mg (65 mg elemental iron) oral tablet: 1 tab(s) orally once a day   folic acid 0.4 mg oral tablet: 1 tab(s) orally once a day  furosemide 20 mg oral tablet: 1 tab(s) orally once a day  Incruse Ellipta 62.5 mcg/inh inhalation powder: 1 puff(s) inhaled every 24 hours  lisinopril 20 mg oral tablet: 1 tab(s) orally once a day

## 2023-03-07 ENCOUNTER — HOSPITAL ENCOUNTER (OUTPATIENT)
Dept: CARDIOLOGY | Age: 78
Discharge: HOME | End: 2023-03-07
Payer: MEDICARE

## 2023-03-07 ENCOUNTER — OFFICE VISIT (OUTPATIENT)
Dept: CARDIOLOGY CLINIC | Age: 78
End: 2023-03-07
Payer: MEDICARE

## 2023-03-07 VITALS
HEIGHT: 55 IN | TEMPERATURE: 98.2 F | HEART RATE: 64 BPM | BODY MASS INDEX: 24.71 KG/M2 | SYSTOLIC BLOOD PRESSURE: 160 MMHG | DIASTOLIC BLOOD PRESSURE: 70 MMHG | WEIGHT: 106.8 LBS

## 2023-03-07 DIAGNOSIS — Z95.2 S/P TAVR (TRANSCATHETER AORTIC VALVE REPLACEMENT): Primary | ICD-10-CM

## 2023-03-07 DIAGNOSIS — I72.1: ICD-10-CM

## 2023-03-07 DIAGNOSIS — R22.32: ICD-10-CM

## 2023-03-07 LAB
LV EF: 48 %
LVEF MODALITY: NORMAL

## 2023-03-07 PROCEDURE — 3077F SYST BP >= 140 MM HG: CPT | Performed by: PHYSICIAN ASSISTANT

## 2023-03-07 PROCEDURE — 1090F PRES/ABSN URINE INCON ASSESS: CPT | Performed by: PHYSICIAN ASSISTANT

## 2023-03-07 PROCEDURE — G8484 FLU IMMUNIZE NO ADMIN: HCPCS | Performed by: PHYSICIAN ASSISTANT

## 2023-03-07 PROCEDURE — 1036F TOBACCO NON-USER: CPT | Performed by: PHYSICIAN ASSISTANT

## 2023-03-07 PROCEDURE — G8400 PT W/DXA NO RESULTS DOC: HCPCS | Performed by: PHYSICIAN ASSISTANT

## 2023-03-07 PROCEDURE — 93306 TTE W/DOPPLER COMPLETE: CPT

## 2023-03-07 PROCEDURE — 99213 OFFICE O/P EST LOW 20 MIN: CPT | Performed by: PHYSICIAN ASSISTANT

## 2023-03-07 PROCEDURE — G8420 CALC BMI NORM PARAMETERS: HCPCS | Performed by: PHYSICIAN ASSISTANT

## 2023-03-07 PROCEDURE — G8427 DOCREV CUR MEDS BY ELIG CLIN: HCPCS | Performed by: PHYSICIAN ASSISTANT

## 2023-03-07 PROCEDURE — 3078F DIAST BP <80 MM HG: CPT | Performed by: PHYSICIAN ASSISTANT

## 2023-03-07 PROCEDURE — 1123F ACP DISCUSS/DSCN MKR DOCD: CPT | Performed by: PHYSICIAN ASSISTANT

## 2023-03-07 NOTE — PROGRESS NOTES
Structural Heart Clinic Note      Patient name: Tara Pascual    Reason for visit: TAVR follow up     Primary Care Physician: Franky Sanchez MD    Date of service: 3/7/2023    Chief Complaint: TAVR follow up - one year    HPI: Mrs. Marlen Escalante presents for follow up s/p TAVR on 3/23/22. She is still doing great from procedure standpoint. She denies chest pain, sob/jean-baptiste, orthopnea, PND, LE edema. She is walking for exercise without difficulty. She notes a lump in the left antecubital that has been present since the TAVR. It is not painful but has not gone away. Allergies: No Known Allergies    Home medications:    Current Outpatient Medications   Medication Sig Dispense Refill    lisinopril (PRINIVIL;ZESTRIL) 20 MG tablet TAKE ONE TABLET BY MOUTH ONCE DAILY 90 tablet 3    lisinopril (PRINIVIL;ZESTRIL) 30 MG tablet Take 1 tablet by mouth daily 30 tablet 1    rosuvastatin (CRESTOR) 20 MG tablet Take 1 tablet by mouth daily 30 tablet 0    metoprolol succinate (TOPROL XL) 25 MG extended release tablet Take 1 tablet by mouth daily 30 tablet 0    torsemide (DEMADEX) 10 MG tablet Take 1 tablet by mouth daily 30 tablet 0    potassium chloride (KLOR-CON M) 20 MEQ extended release tablet Take 1 tablet by mouth daily (with breakfast) 60 tablet 0    vitamin B-12 (CYANOCOBALAMIN) 100 MCG tablet Take 50 mcg by mouth daily      aspirin 81 MG EC tablet Take 81 mg by mouth daily       No current facility-administered medications for this visit.      Facility-Administered Medications Ordered in Other Visits   Medication Dose Route Frequency Provider Last Rate Last Admin    perflutren lipid microspheres (DEFINITY) injection 1.5 mL  1.5 mL IntraVENous ONCE PRN MADYSON Shetty           Past Medical History:  Past Medical History:   Diagnosis Date    CAD (coronary artery disease) 03/15/2022    HFrEF (heart failure with reduced ejection fraction) ([facility] [address] 03/12/2022    Hypertension     Nonrheumatic aortic valve stenosis 03/12/2022    Critical    NSTEMI (non-ST elevated myocardial infarction) ([facility] [address] 03/11/2022    Pleural effusion 03/12/2022    Bilat       Past Surgical History:  Past Surgical History:   Procedure Laterality Date    AORTIC VALVE REPLACEMENT N/A 3/23/2022    TRANSCATHETER AORTIC VALVE REPLACEMENT performed by Cecilio Daniel MD at [address] [facility]  03/15/2022    Dr. Arnulfo Gaytan, TOTAL ABDOMINAL (CERVIX REMOVED)      JOINT REPLACEMENT  03/12/2019    left knee    TRANSESOPHAGEAL ECHOCARDIOGRAM  03/15/2022    Dr Tawnya Valencia       Social History:  Social History     Socioeconomic History    Marital status:      Spouse name: Not on file    Number of children: Not on file    Years of education: Not on file    Highest education level: Not on file   Occupational History    Occupation: CNA   Tobacco Use    Smoking status: Never    Smokeless tobacco: Never   Vaping Use    Vaping Use: Never used   Substance and Sexual Activity    Alcohol use: Not Currently    Drug use: Never    Sexual activity: Not on file   Other Topics Concern    Not on file   Social History Narrative    1 cup coffee daily      Social Determinants of Health     Financial Resource Strain: Not on file   Food Insecurity: Not on file   Transportation Needs: Not on file   Physical Activity: Not on file   Stress: Not on file   Social Connections: Not on file   Intimate Partner Violence: Not on file   Housing Stability: Not on file       Family History:  Family History   Problem Relation Age of Onset    Stroke Mother     Heart Attack Father        Review of Systems:  Constitutional: Denies fevers, chills, or weight loss. HEENT: Denies visual changes or hearing loss. Heart: As per HPI. Lungs: Denies shortness of breath, cough, or wheezing. Gastrointestinal: Denies nausea, vomiting, constipation, or diarrhea. Genitourinary: dysuria or hematuria. Psychiatric: Patient denies anxiety or depression.    Neurologic: Patient denies weakness of the extremities, dizziness, or headaches. All other ROS checked and found to be negative. Objective:  /70  P 60  R 16  T 98.5  General Appearance: Pleasant 65y.o. year old female who appears stated age. Communicates well, no acute distress. HEENT: Head is normocephalic, atraumatic. EOMs intact, PERRL. Trachea midline. Lungs: Normal respiratory rate and normal effort. She is not in respiratory distress. Breath sounds clear to auscultation. No wheezes. Heart: Normal rate. Regular rhythm. S1 normal and S2 normal. No murmur. Chest: Symmetric chest wall expansion. Extremities: Normal range of motion. No edema. Neurological: Patient is alert and oriented to person, place and time. Skin: Warm and dry. Abdomen: Abdomen is soft and non-distended. Bowel sounds are normal.   Pulses: Distal pulses are intact. Skin: Warm and dry without lesions. Left antecubital: 1 cm pulsatile mass noted. No tenderness, swelling or erythema      Assessment:   Patient Active Problem List   Diagnosis    Hypertension    NSTEMI (non-ST elevated myocardial infarction) ([facility] [address]    Nodular calcific aortic valve stenosis    S/P TAVR (transcatheter aortic valve replacement)    Coronary artery disease involving native coronary artery of native heart without angina pectoris       1. Critical aortic stenosis s/p TAVR using #23 S3 Ultra valve   A. POD1: MG 16, DI 0.41, no AI/PVL, moderate MR/TR, EF 28%   B. 30 day: MG 12, DI 0.39, trace PVL, mild MR, trace TR, EF 30-35%   C. 1 year: MG 17, DI 0.38; full report pending  2. HFrEF  3. CAD - 80% distal LAD; otherwise moderate disease  4. HTN  5. HLD  6. LBBB  7. 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 Follow up for two-year echo 3/19/2024; sooner PRN  2. Follow up with Daysi Naranjo and Patrick as scheduled  3. SBE prophylaxis reviewed  4.  LUE ultrasound       Electronically signed by Irina Angeles PA-C on 3/7/2023 at 1:19 PM

## 2023-03-07 NOTE — PATIENT INSTRUCTIONS
Follow up in valve clinic on 3/19/2024 at 10:30 am for echocardiogram; call sooner if concerns arise in the meantime    Follow up with Dr. Genie Waters as scheduled    Reminder: antibiotic required prior to dental appointments     Call Dr. Terry Lane [phone]

## 2023-03-13 ENCOUNTER — TELEPHONE (OUTPATIENT)
Dept: CARDIOLOGY | Age: 78
End: 2023-03-13

## 2023-03-13 DIAGNOSIS — I72.1 ANEURYSM OF ARTERY OF UPPER EXTREMITY (HCC): ICD-10-CM

## 2023-03-13 DIAGNOSIS — R22.32: Primary | ICD-10-CM

## 2023-03-13 NOTE — TELEPHONE ENCOUNTER
Patient notified of [address] scheduled 3/29/23 at [facility] at 1100 with 21 [phone] arrival time. She states understanding. Will be in touch with results once available.

## 2023-03-29 ENCOUNTER — HOSPITAL ENCOUNTER (OUTPATIENT)
Dept: ULTRASOUND IMAGING | Age: 78
Discharge: HOME | End: 2023-03-31
Payer: MEDICARE

## 2023-03-29 DIAGNOSIS — I72.1 ANEURYSM OF ARTERY OF UPPER EXTREMITY (HCC): ICD-10-CM

## 2023-03-29 DIAGNOSIS — R22.32: ICD-10-CM

## 2023-03-29 PROCEDURE — 93931 UPPER EXTREMITY STUDY: CPT

## 2023-04-26 ENCOUNTER — HOSPITAL ENCOUNTER (INPATIENT)
Age: 78
LOS: 2 days | Discharge: HOME | End: 2023-04-28
Attending: EMERGENCY MEDICINE | Admitting: INTERNAL MEDICINE
Payer: MEDICARE

## 2023-04-26 ENCOUNTER — APPOINTMENT (OUTPATIENT)
Dept: CT IMAGING | Age: 78
End: 2023-04-26
Payer: MEDICARE

## 2023-04-26 ENCOUNTER — APPOINTMENT (OUTPATIENT)
Dept: GENERAL RADIOLOGY | Age: 78
End: 2023-04-26
Payer: MEDICARE

## 2023-04-26 DIAGNOSIS — K40.90 RIGHT INGUINAL HERNIA: ICD-10-CM

## 2023-04-26 DIAGNOSIS — D72.829 LEUKOCYTOSIS, UNSPECIFIED TYPE: ICD-10-CM

## 2023-04-26 DIAGNOSIS — K56.41 FECAL IMPACTION (HCC): ICD-10-CM

## 2023-04-26 DIAGNOSIS — K52.9 COLITIS: Primary | ICD-10-CM

## 2023-04-26 LAB
ALBUMIN SERPL-MCNC: 4.3 G/DL (ref 3.5–5.2)
ALP SERPL-CCNC: 77 U/L (ref 35–104)
ALT SERPL-CCNC: 10 U/L (ref 0–32)
ANION GAP SERPL CALCULATED.3IONS-SCNC: 14 MMOL/L (ref 7–16)
AST SERPL-CCNC: 26 U/L (ref 0–31)
BACTERIA URNS QL MICRO: ABNORMAL /HPF
BASOPHILS # BLD: 0.11 E9/L (ref 0–0.2)
BASOPHILS NFR BLD: 0.6 % (ref 0–2)
BILIRUB SERPL-MCNC: 0.7 MG/DL (ref 0–1.2)
BILIRUB UR QL STRIP: NEGATIVE
BUN SERPL-MCNC: 14 MG/DL (ref 6–23)
CALCIUM SERPL-MCNC: 9.8 MG/DL (ref 8.6–10.2)
CHLORIDE SERPL-SCNC: 101 MMOL/L (ref 98–107)
CLARITY UR: CLEAR
CO2 SERPL-SCNC: 26 MMOL/L (ref 22–29)
COLOR UR: YELLOW
CREAT SERPL-MCNC: 0.8 MG/DL (ref 0.5–1)
EOSINOPHIL # BLD: 0.08 E9/L (ref 0.05–0.5)
EOSINOPHIL NFR BLD: 0.5 % (ref 0–6)
EPI CELLS #/AREA URNS HPF: ABNORMAL /HPF
ERYTHROCYTE [DISTWIDTH] IN BLOOD BY AUTOMATED COUNT: 13.3 FL (ref 11.5–15)
GLUCOSE SERPL-MCNC: 119 MG/DL (ref 74–99)
GLUCOSE UR STRIP-MCNC: NEGATIVE MG/DL
HCT VFR BLD AUTO: 38.9 % (ref 34–48)
HGB BLD-MCNC: 12.9 G/DL (ref 11.5–15.5)
HGB UR QL STRIP: ABNORMAL
IMM GRANULOCYTES # BLD: 0.12 E9/L
IMM GRANULOCYTES NFR BLD: 0.7 % (ref 0–5)
KETONES UR STRIP-MCNC: ABNORMAL MG/DL
LACTATE BLDV-SCNC: 2 MMOL/L (ref 0.5–2.2)
LACTATE BLDV-SCNC: 2.3 MMOL/L (ref 0.5–2.2)
LEUKOCYTE ESTERASE UR QL STRIP: NEGATIVE
LYMPHOCYTES # BLD: 1.39 E9/L (ref 1.5–4)
LYMPHOCYTES NFR BLD: 7.9 % (ref 20–42)
MCH RBC QN AUTO: 28.9 PG (ref 26–35)
MCHC RBC AUTO-ENTMCNC: 33.2 % (ref 32–34.5)
MCV RBC AUTO: 87 FL (ref 80–99.9)
MONOCYTES # BLD: 1.34 E9/L (ref 0.1–0.95)
MONOCYTES NFR BLD: 7.6 % (ref 2–12)
NEUTROPHILS # BLD: 14.59 E9/L (ref 1.8–7.3)
NEUTS SEG NFR BLD: 82.7 % (ref 43–80)
NITRITE UR QL STRIP: NEGATIVE
PH UR STRIP: 7.5 [PH] (ref 5–9)
PLATELET # BLD AUTO: 241 E9/L (ref 130–450)
PMV BLD AUTO: 9.5 FL (ref 7–12)
POTASSIUM SERPL-SCNC: 3.5 MMOL/L (ref 3.5–5)
PROT SERPL-MCNC: 7.5 G/DL (ref 6.4–8.3)
PROT UR STRIP-MCNC: 30 MG/DL
RBC # BLD AUTO: 4.47 E12/L (ref 3.5–5.5)
RBC #/AREA URNS HPF: ABNORMAL /HPF (ref 0–2)
SODIUM SERPL-SCNC: 141 MMOL/L (ref 132–146)
SP GR UR STRIP: 1.01 (ref 1–1.03)
UROBILINOGEN UR STRIP-ACNC: 1 E.U./DL
WBC # BLD: 17.6 E9/L (ref 4.5–11.5)
WBC #/AREA URNS HPF: ABNORMAL /HPF (ref 0–5)

## 2023-04-26 PROCEDURE — 96374 THER/PROPH/DIAG INJ IV PUSH: CPT

## 2023-04-26 PROCEDURE — 36415 COLL VENOUS BLD VENIPUNCTURE: CPT

## 2023-04-26 PROCEDURE — 83605 ASSAY OF LACTIC ACID: CPT

## 2023-04-26 PROCEDURE — 81001 URINALYSIS AUTO W/SCOPE: CPT

## 2023-04-26 PROCEDURE — 96375 TX/PRO/DX INJ NEW DRUG ADDON: CPT

## 2023-04-26 PROCEDURE — 2580000003 HC RX 258: Performed by: EMERGENCY MEDICINE

## 2023-04-26 PROCEDURE — 85025 COMPLETE CBC W/AUTO DIFF WBC: CPT

## 2023-04-26 PROCEDURE — 87088 URINE BACTERIA CULTURE: CPT

## 2023-04-26 PROCEDURE — 2500000003 HC RX 250 WO HCPCS: Performed by: EMERGENCY MEDICINE

## 2023-04-26 PROCEDURE — 80053 COMPREHEN METABOLIC PANEL: CPT

## 2023-04-26 PROCEDURE — 99285 EMERGENCY DEPT VISIT HI MDM: CPT

## 2023-04-26 PROCEDURE — 1200000000 HC SEMI PRIVATE

## 2023-04-26 PROCEDURE — 6360000002 HC RX W HCPCS: Performed by: EMERGENCY MEDICINE

## 2023-04-26 PROCEDURE — 74177 CT ABD & PELVIS W/CONTRAST: CPT

## 2023-04-26 PROCEDURE — 74018 RADEX ABDOMEN 1 VIEW: CPT

## 2023-04-26 PROCEDURE — 6360000004 HC RX CONTRAST MEDICATION: Performed by: RADIOLOGY

## 2023-04-26 RX ORDER — UBIDECARENONE 75 MG
50 CAPSULE ORAL DAILY
Status: DISCONTINUED | OUTPATIENT
Start: 2023-04-27 | End: 2023-04-28 | Stop reason: HOSPADM

## 2023-04-26 RX ORDER — METRONIDAZOLE 500 MG/100ML
500 INJECTION, SOLUTION INTRAVENOUS ONCE
Status: COMPLETED | OUTPATIENT
Start: 2023-04-26 | End: 2023-04-26

## 2023-04-26 RX ORDER — SODIUM CHLORIDE 9 MG/ML
INJECTION, SOLUTION INTRAVENOUS CONTINUOUS
Status: DISCONTINUED | OUTPATIENT
Start: 2023-04-26 | End: 2023-04-28 | Stop reason: HOSPADM

## 2023-04-26 RX ORDER — ASPIRIN 81 MG/1
81 TABLET ORAL DAILY
Status: DISCONTINUED | OUTPATIENT
Start: 2023-04-27 | End: 2023-04-28 | Stop reason: HOSPADM

## 2023-04-26 RX ORDER — ROSUVASTATIN CALCIUM 20 MG/1
20 TABLET, COATED ORAL DAILY
Status: DISCONTINUED | OUTPATIENT
Start: 2023-04-27 | End: 2023-04-28 | Stop reason: HOSPADM

## 2023-04-26 RX ORDER — TORSEMIDE 10 MG/1
10 TABLET ORAL DAILY
Status: DISCONTINUED | OUTPATIENT
Start: 2023-04-27 | End: 2023-04-28 | Stop reason: HOSPADM

## 2023-04-26 RX ORDER — 0.9 % SODIUM CHLORIDE 0.9 %
1000 INTRAVENOUS SOLUTION INTRAVENOUS ONCE
Status: COMPLETED | OUTPATIENT
Start: 2023-04-26 | End: 2023-04-26

## 2023-04-26 RX ORDER — POTASSIUM CHLORIDE 20 MEQ/1
20 TABLET, EXTENDED RELEASE ORAL
Status: DISCONTINUED | OUTPATIENT
Start: 2023-04-27 | End: 2023-04-28 | Stop reason: HOSPADM

## 2023-04-26 RX ORDER — METOPROLOL SUCCINATE 25 MG/1
25 TABLET, EXTENDED RELEASE ORAL DAILY
Status: DISCONTINUED | OUTPATIENT
Start: 2023-04-27 | End: 2023-04-28 | Stop reason: HOSPADM

## 2023-04-26 RX ADMIN — SODIUM CHLORIDE: 9 INJECTION, SOLUTION INTRAVENOUS at 17:17

## 2023-04-26 RX ADMIN — CEFTRIAXONE 1000 MG: 1 INJECTION, POWDER, FOR SOLUTION INTRAMUSCULAR; INTRAVENOUS at 17:17

## 2023-04-26 RX ADMIN — METRONIDAZOLE 500 MG: 500 INJECTION, SOLUTION INTRAVENOUS at 17:19

## 2023-04-26 RX ADMIN — IOPAMIDOL 50 ML: 755 INJECTION, SOLUTION INTRAVENOUS at 13:57

## 2023-04-26 RX ADMIN — SODIUM CHLORIDE 1000 ML: 9 INJECTION, SOLUTION INTRAVENOUS at 14:31

## 2023-04-26 NOTE — ED PROVIDER NOTES
[address] [address], [facility] ENCOUNTER        Pt Name: Rudy Echavarria  MRN: [id number]  [address] [facility] 1945  Date of evaluation: 4/26/2023  Provider: Delgado Grover DO  PCP: Deonte Olmedo MD  Note Started: 9:37 AM EDT 4/26/23    CHIEF COMPLAINT       Chief Complaint   Patient presents with    Fecal Impaction     For two days had not had a BM        HISTORY OF PRESENT ILLNESS: 1 or more Elements   History From: patient and spouse    Limitations to history : None    Rudy Echavarria is a 65 y.o. female who presents with fecal ilmpaction beginning 2 days ago. Patient states that she has some issues with chronic constipation but does not take anything on a daily basis for a bowel regimen. She did take some MiraLAX yesterday and tried half an enema without improvement. Patient states she feels the stool in her rectal vault but cannot make it move out. She is now leaking liquid stool. She denies abdominal pain, nausea or vomiting, fever or chills. The complaint has been persistent, moderate in severity, and worsened by nothing. Patient denies fever/chills, sore throat, cough, congestion, chest pain, shortness of breath, edema, headache, visual disturbances, focal paresthesias, focal weakness, abdominal pain, nausea, vomiting, diarrhea, dysuria, hematuria, trauma, neck or back pain, rash or other complaints. Denies recent medication changes or travel. Nursing Notes were all reviewed and agreed with or any disagreements were addressed in the HPI. REVIEW OF SYSTEMS :           Positives and Pertinent negatives as per HPI.      SURGICAL HISTORY     Past Surgical History:   Procedure Laterality Date    AORTIC VALVE REPLACEMENT N/A 3/23/2022    TRANSCATHETER AORTIC VALVE REPLACEMENT performed by Petty Acosta MD at [address] [address] [address]  03/15/2022    Dr. Hanford Closs, TOTAL ABDOMINAL (CERVIX REMOVED) mis-transcribed.)    Robbie Perez DO (electronically signed)            Robbie Perez DO  04/26/23 6401

## 2023-04-27 PROCEDURE — 2580000003 HC RX 258: Performed by: INTERNAL MEDICINE

## 2023-04-27 PROCEDURE — 6370000000 HC RX 637 (ALT 250 FOR IP): Performed by: INTERNAL MEDICINE

## 2023-04-27 PROCEDURE — 6360000002 HC RX W HCPCS: Performed by: INTERNAL MEDICINE

## 2023-04-27 PROCEDURE — 6370000000 HC RX 637 (ALT 250 FOR IP): Performed by: NURSE PRACTITIONER

## 2023-04-27 PROCEDURE — 1200000000 HC SEMI PRIVATE

## 2023-04-27 RX ORDER — AMOXICILLIN AND CLAVULANATE POTASSIUM 875; 125 MG/1; MG/1
1 TABLET, FILM COATED ORAL EVERY 12 HOURS SCHEDULED
Status: DISCONTINUED | OUTPATIENT
Start: 2023-04-27 | End: 2023-04-28 | Stop reason: HOSPADM

## 2023-04-27 RX ORDER — SENNA AND DOCUSATE SODIUM 50; 8.6 MG/1; MG/1
2 TABLET, FILM COATED ORAL DAILY
Status: DISCONTINUED | OUTPATIENT
Start: 2023-04-27 | End: 2023-04-28 | Stop reason: HOSPADM

## 2023-04-27 RX ORDER — POLYETHYLENE GLYCOL 3350 17 G/17G
17 POWDER, FOR SOLUTION ORAL DAILY
Status: DISCONTINUED | OUTPATIENT
Start: 2023-04-27 | End: 2023-04-28 | Stop reason: HOSPADM

## 2023-04-27 RX ORDER — LACTULOSE 10 G/15ML
20 SOLUTION ORAL 3 TIMES DAILY
Status: DISCONTINUED | OUTPATIENT
Start: 2023-04-27 | End: 2023-04-28 | Stop reason: HOSPADM

## 2023-04-27 RX ADMIN — ASPIRIN 81 MG: 81 TABLET, COATED ORAL at 09:11

## 2023-04-27 RX ADMIN — LACTULOSE 20 G: 20 SOLUTION ORAL at 13:19

## 2023-04-27 RX ADMIN — Medication 50 MCG: at 09:13

## 2023-04-27 RX ADMIN — TORSEMIDE 10 MG: 10 TABLET ORAL at 09:12

## 2023-04-27 RX ADMIN — PIPERACILLIN AND TAZOBACTAM 3375 MG: 3; .375 INJECTION, POWDER, LYOPHILIZED, FOR SOLUTION INTRAVENOUS at 08:13

## 2023-04-27 RX ADMIN — PIPERACILLIN AND TAZOBACTAM 3375 MG: 3; .375 INJECTION, POWDER, LYOPHILIZED, FOR SOLUTION INTRAVENOUS at 16:20

## 2023-04-27 RX ADMIN — AMOXICILLIN AND CLAVULANATE POTASSIUM 1 TABLET: 875; 125 TABLET, FILM COATED ORAL at 22:20

## 2023-04-27 RX ADMIN — ROSUVASTATIN CALCIUM 20 MG: 20 TABLET, FILM COATED ORAL at 09:12

## 2023-04-27 RX ADMIN — POLYETHYLENE GLYCOL 3350 17 G: 17 POWDER, FOR SOLUTION ORAL at 13:20

## 2023-04-27 RX ADMIN — LISINOPRIL 30 MG: 20 TABLET ORAL at 09:11

## 2023-04-27 RX ADMIN — METOPROLOL SUCCINATE 25 MG: 25 TABLET, EXTENDED RELEASE ORAL at 09:12

## 2023-04-27 RX ADMIN — SENNOSIDES AND DOCUSATE SODIUM 2 TABLET: 50; 8.6 TABLET ORAL at 13:19

## 2023-04-27 RX ADMIN — PIPERACILLIN AND TAZOBACTAM 4500 MG: 4; .5 INJECTION, POWDER, LYOPHILIZED, FOR SOLUTION INTRAVENOUS at 00:15

## 2023-04-27 RX ADMIN — POTASSIUM CHLORIDE 20 MEQ: 1500 TABLET, EXTENDED RELEASE ORAL at 08:08

## 2023-04-27 ASSESSMENT — PAIN DESCRIPTION - DESCRIPTORS: DESCRIPTORS: DISCOMFORT;CRAMPING

## 2023-04-27 ASSESSMENT — PAIN DESCRIPTION - ORIENTATION: ORIENTATION: PROXIMAL

## 2023-04-27 ASSESSMENT — PAIN SCALES - GENERAL
PAINLEVEL_OUTOF10: 0
PAINLEVEL_OUTOF10: 5

## 2023-04-27 ASSESSMENT — PAIN DESCRIPTION - LOCATION: LOCATION: ABDOMEN

## 2023-04-27 ASSESSMENT — PAIN DESCRIPTION - ONSET: ONSET: ON-GOING

## 2023-04-27 ASSESSMENT — PAIN DESCRIPTION - FREQUENCY: FREQUENCY: CONTINUOUS

## 2023-04-27 ASSESSMENT — PAIN DESCRIPTION - PAIN TYPE: TYPE: ACUTE PAIN

## 2023-04-27 NOTE — CONSULTS
GENERAL SURGERY  CONSULT NOTE  4/27/2023    Physician Consulted: Dr. Cheli Vargas  Reason for Consult: Colitis  Referring Physician: Dr. Debi Mitchell    HPI  Grover Barry is a 65 y.o. female who presents for evaluation of colitis. Pt states that she has had lifelong issues with constipation that usually resolves with laxative or enema. Stated she had one c-scope over 10 years ago and does not remember there being anything wrong. S/o over a week of constipation that she has not been able to resolve herself, now accompanied by lose stool incontinence. Denies any fever, chills, hematochezia, diarrhea or pain. Past Medical History:   Diagnosis Date    CAD (coronary artery disease) 03/15/2022    HFrEF (heart failure with reduced ejection fraction) ([address]) 03/12/2022    Hypertension     Nonrheumatic aortic valve stenosis 03/12/2022    Critical    NSTEMI (non-ST elevated myocardial infarction) ([address]) 03/11/2022    Pleural effusion 03/12/2022    Bilat       Past Surgical History:   Procedure Laterality Date    AORTIC VALVE REPLACEMENT N/A 3/23/2022    TRANSCATHETER AORTIC VALVE REPLACEMENT performed by Liyah Mckay MD at [address]  03/15/2022    Dr. Carolina Hogan, TOTAL ABDOMINAL (CERVIX REMOVED)      JOINT REPLACEMENT  03/12/2019    left knee    TRANSESOPHAGEAL ECHOCARDIOGRAM  03/15/2022    Dr Tristan Chadwick       Medications Prior to Admission:    Prior to Admission medications    Medication Sig Start Date End Date Taking?  Authorizing Provider   lisinopril (PRINIVIL;ZESTRIL) 30 MG tablet Take 1 tablet by mouth daily 4/26/22   [facility]   rosuvastatin (CRESTOR) 20 MG tablet Take 1 tablet by mouth daily 3/16/22   [facility] [place] Jolly, DO   metoprolol succinate (TOPROL XL) 25 MG extended release tablet Take 1 tablet by mouth daily 3/16/22   [facility] Zenonmer, DO   torsemide (DEMADEX) 10 MG tablet Take 1 tablet by mouth daily 3/16/22   [facility] Rik, DO   potassium chloride (KLOR-CON M) 20 MEQ extended release tablet Take 1 tablet by mouth daily (with breakfast) 3/16/22   Robert Jolly DO   vitamin B-12 (CYANOCOBALAMIN) 100 MCG tablet Take 0.5 tablets by mouth daily    Historical Provider, MD   aspirin 81 MG EC tablet Take 1 tablet by mouth daily    Historical Provider, MD       No Known Allergies    Family History   Problem Relation Age of Onset    Stroke Mother     Heart Attack Father        Social History     Tobacco Use    Smoking status: Never    Smokeless tobacco: Never   Vaping Use    Vaping Use: Never used   Substance Use Topics    Alcohol use: Not Currently    Drug use: Never         Review of Systems   General ROS: negative  Hematological and Lymphatic ROS: negative  Respiratory ROS: no cough, shortness of breath, or wheezing  Cardiovascular ROS: no chest pain or dyspnea on exertion  Gastrointestinal ROS: positive for - constipation and stool incontinence  Genito-Urinary ROS: no dysuria, trouble voiding, or hematuria  Musculoskeletal ROS: negative      PHYSICAL EXAM:    Vitals:    04/27/23 0911   BP: (!) 150/60   Pulse: 73   Resp:    Temp:    SpO2:        General Appearance:  awake, alert, oriented, in no acute distress  Skin:  Skin color, texture, turgor normal. No rashes or lesions. Head/face:  NCAT  Eyes:  No gross abnormalities. Lungs:  Normal expansion. Clear to auscultation. No rales, rhonchi, or wheezing. Heart:  Heart sounds are normal.  Regular rate and rhythm without murmur, gallop or rub. Abdomen:  Soft, non-tender, normal bowel sounds. No bruits, organomegaly or masses. Extremities: Extremities warm to touch, pink, with no edema.   Female Rectal: Rectal exam deferred secondary to pain    LABS:  CBC  Recent Labs     04/26/23  1155   WBC 17.6*   HGB 12.9   HCT 38.9        BMP  Recent Labs     04/26/23  1155      K 3.5      CO2 26   BUN 14   CREATININE 0.8   CALCIUM 9.8     Liver Function  Recent Labs     04/26/23  1155

## 2023-04-27 NOTE — ED NOTES
[facility] called for transportation to [facility].    ETA [address] Layne, RN  04/26/23 2053

## 2023-04-27 NOTE — PLAN OF CARE
Problem: Safety - Adult  Goal: Free from fall injury  4/27/2023 1159 by Sonia Morrell RN  Outcome: Progressing     Problem: Pain  Goal: Verbalizes/displays adequate comfort level or baseline comfort level  Outcome: Progressing

## 2023-04-27 NOTE — CARE COORDINATION
Patient admitted for evaluation of colitis. Per general surgery note today, Pt states that she has had lifelong issues with constipation that usually resolves with laxative or enema. Stated she had one c-scope over 10 years ago and does not remember there being anything wrong. S/o over a week of constipation that she has not been able to resolve herself, now accompanied by lose stool incontinence. Plan is Aggressive bowel reg. I met with patient and  in room to explain role and discuss transition of care. She lives with  in a one story home with bed and bath on the main floor 2 steps to enter. She does not own any DME. Her PCP is Feliberto Preciado MD and pharmacy is [facility]. She said the plan is to return home with  when medically ready and he will transport her home at time of discharge. Emilie Paulino RN   [phone]          Case Management Assessment  Initial Evaluation    Date/Time of Evaluation: 4/27/2023 3:29 PM  Assessment Completed by: Emilie Paulino RN    If patient is discharged prior to next notation, then this note serves as note for discharge by case management. Patient Name: Alisia Hoyos                   YOB: 1945  Diagnosis: Colitis [K52.9]  Right inguinal hernia [K40.90]  Fecal impaction ([address] [K56.41]  Leukocytosis, unspecified type [D72.829]                   Date / Time: 4/26/2023  9:03 AM    Patient Admission Status: Inpatient   Readmission Risk (Low < 19, Mod (19-27), High > 27): Readmission Risk Score: 7.4    Current PCP: Feliberto Preciado MD  PCP verified by ? Yes Feliberto Preciado MD)    Chart Reviewed: Yes      History Provided by: Patient  Patient Orientation: Alert and Oriented    Patient Cognition: Alert    Hospitalization in the last 30 days (Readmission):  No    If yes, Readmission Assessment in  Navigator will be completed.     Advance Directives:      Code Status: Full Code   Patient's Primary Decision Maker

## 2023-04-27 NOTE — ED NOTES
Report called to Tip Pulliam at [facility]  room [id number].    PAS here at this time to transport patient     Ryne Macias RN  04/26/23 9104

## 2023-04-27 NOTE — H&P
[facility] Internal Medicine  History and Physical      CHIEF COMPLAINT: Constipation    Reason for Admission: Abnormal CT scan of abdomen pelvis    History Obtained From: Patient    PCP :  MD Tang [facility] [id number]      HISTORY OF PRESENT ILLNESS:      The patient is a 65 y.o. female Presented to the emergency For constipation issues patient reports that she has had chronic abdominal pain issues with constipation. Patient has had no pain. In the emergency room patient was noted to have possible colitis on CT scan. Inflammatory changes were mentioned around rectum with wall thickening. Question of stercoral colitis. Patient admitted. She has no pain.     Past Medical History:        Diagnosis Date    CAD (coronary artery disease) 03/15/2022    HFrEF (heart failure with reduced ejection fraction) ([address]) 03/12/2022    Hypertension     Nonrheumatic aortic valve stenosis 03/12/2022    Critical    NSTEMI (non-ST elevated myocardial infarction) ([address]) 03/11/2022    Pleural effusion 03/12/2022    Bilat     Past Surgical History:        Procedure Laterality Date    AORTIC VALVE REPLACEMENT N/A 3/23/2022    TRANSCATHETER AORTIC VALVE REPLACEMENT performed by Avelina Cheng MD at [address] [address] [address]  03/15/2022    Dr. Jose Delacruz, TOTAL ABDOMINAL (CERVIX REMOVED)      JOINT REPLACEMENT  03/12/2019    left knee    TRANSESOPHAGEAL ECHOCARDIOGRAM  03/15/2022    Dr Marjorie Pike         Medications Prior to Admission:    Medications Prior to Admission: lisinopril (PRINIVIL;ZESTRIL) 30 MG tablet, Take 1 tablet by mouth daily  rosuvastatin (CRESTOR) 20 MG tablet, Take 1 tablet by mouth daily  metoprolol succinate (TOPROL XL) 25 MG extended release tablet, Take 1 tablet by mouth daily  torsemide (DEMADEX) 10 MG tablet, Take 1 tablet by mouth daily (Patient taking differently: Take 20 mg by mouth daily)  potassium chloride (KLOR-CON M) 20 MEQ extended release opinion      Electronically signed by Italia Palma MD on 4/27/2023 at 10:35 AM    NOTE: This report was transcribed using voice recognition software.  Every effort was made to ensure accuracy; however, inadvertent transcription errors may be present

## 2023-04-28 VITALS
DIASTOLIC BLOOD PRESSURE: 60 MMHG | TEMPERATURE: 98.8 F | HEART RATE: 63 BPM | HEIGHT: 55 IN | OXYGEN SATURATION: 98 % | RESPIRATION RATE: 18 BRPM | BODY MASS INDEX: 23.14 KG/M2 | SYSTOLIC BLOOD PRESSURE: 150 MMHG | WEIGHT: 100 LBS

## 2023-04-28 PROCEDURE — 99231 SBSQ HOSP IP/OBS SF/LOW 25: CPT | Performed by: SURGERY

## 2023-04-28 PROCEDURE — 6370000000 HC RX 637 (ALT 250 FOR IP): Performed by: INTERNAL MEDICINE

## 2023-04-28 RX ADMIN — POTASSIUM CHLORIDE 20 MEQ: 1500 TABLET, EXTENDED RELEASE ORAL at 07:47

## 2023-04-28 RX ADMIN — LISINOPRIL 30 MG: 20 TABLET ORAL at 07:47

## 2023-04-28 RX ADMIN — TORSEMIDE 10 MG: 10 TABLET ORAL at 07:47

## 2023-04-28 RX ADMIN — AMOXICILLIN AND CLAVULANATE POTASSIUM 1 TABLET: 875; 125 TABLET, FILM COATED ORAL at 07:47

## 2023-04-28 RX ADMIN — ASPIRIN 81 MG: 81 TABLET, COATED ORAL at 07:47

## 2023-04-28 RX ADMIN — ROSUVASTATIN CALCIUM 20 MG: 20 TABLET, FILM COATED ORAL at 07:47

## 2023-04-28 RX ADMIN — METOPROLOL SUCCINATE 25 MG: 25 TABLET, EXTENDED RELEASE ORAL at 07:47

## 2023-04-28 RX ADMIN — Medication 50 MCG: at 07:51

## 2023-04-28 ASSESSMENT — PAIN SCALES - GENERAL: PAINLEVEL_OUTOF10: 0

## 2023-04-28 NOTE — PROGRESS NOTES
[facility]. Daily Progress Note 4/28/2023    Date of admission:  4/26/2023    CC: Constipation    Subjective:  Patient having frequent bowel movements. Having some episodes of incontinence because she cannot get to the bathroom fast enough. Objective:  BP (!) 144/66   Pulse 70   Temp 97.6 °F (36.4 °C) (Axillary)   Resp 18   Ht 4' 7\" (1.397 m)   Wt 100 lb (45.4 kg)   SpO2 96%   BMI 23.24 kg/m²     GENERAL:  Laying in bed, awake, alert, cooperative, no apparent distress  HEAD: Normocephalic, atraumatic  EYES: No sclera icterus, pupils equal  LUNGS:  No increased work of breathing. room air. CARDIOVASCULAR:  hemodynamically stable   ABDOMEN:  Soft, non-tender, non-distended  EXTREMITIES: No edema or swelling  SKIN: Warm and dry    Assessment/Plan:  65 y.o. female consulted for constipation    Principal Problem:    Colitis  Resolved Problems:    * No resolved hospital problems.  *      - Now with frequent bowel movements   - decrease bowel regimen     Electronically signed by Mariia Vega MD on 4/28/2023 at 7:24 AM      Attending Attestation   I saw and examined the patient, I agree with resident note      Hx taken from patient    I personally reviewed the CT imaging abd pel, she has diffuse stool retention with some proctitis in rectum with retained stool concern for stercoral colitis,        She is now moving her bowels now,     I am managing prescription drugs, Selene Soulier MD FACS

## 2023-04-28 NOTE — PROGRESS NOTES
Subjective:    Chief complaint:    Patient feeling well  Abdominal pain  No overnight events    Objective:    BP (!) 150/60   Pulse 63   Temp 98.8 °F (37.1 °C) (Temporal)   Resp 18   Ht 4' 7\" (1.397 m)   Wt 100 lb (45.4 kg)   SpO2 98%   BMI 23.24 kg/m²   General : Awake ,alert,no distress. Heart:  RRR, no murmurs, gallops, or rubs. Lungs:  CTA bilaterally, no wheeze, rales or rhonchi  Abd: bowel sounds present, nontender, nondistended, no masses  Extrem:  No clubbing, cyanosis, or edema    CBC:   Lab Results   Component Value Date/Time    WBC 17.6 04/26/2023 11:55 AM    RBC 4.47 04/26/2023 11:55 AM    HGB 12.9 04/26/2023 11:55 AM    HCT 38.9 04/26/2023 11:55 AM    MCV 87.0 04/26/2023 11:55 AM    MCH 28.9 04/26/2023 11:55 AM    MCHC 33.2 04/26/2023 11:55 AM    RDW 13.3 04/26/2023 11:55 AM     04/26/2023 11:55 AM    MPV 9.5 04/26/2023 11:55 AM     BMP:    Lab Results   Component Value Date/Time     04/26/2023 11:55 AM    K 3.5 04/26/2023 11:55 AM    K 4.1 03/11/2022 04:25 PM     04/26/2023 11:55 AM    CO2 26 04/26/2023 11:55 AM    BUN 14 04/26/2023 11:55 AM    LABALBU 4.3 04/26/2023 11:55 AM    CREATININE 0.8 04/26/2023 11:55 AM    CALCIUM 9.8 04/26/2023 11:55 AM    GFRAA >60 05/11/2022 03:27 PM    LABGLOM >60 04/26/2023 11:55 AM    GLUCOSE 119 04/26/2023 11:55 AM     PT/INR:    Lab Results   Component Value Date/Time    PROTIME 10.8 03/11/2022 06:03 PM    INR 1.0 03/11/2022 06:03 PM     Troponin:  No results found for: TROPONINI    No results for input(s): LABURIN in the last 72 hours. No results for input(s): BC in the last 72 hours. No results for input(s): Nica Flaming in the last 72 hours.       Current Facility-Administered Medications:     lactulose (CHRONULAC) 10 GM/15ML solution 20 g, 20 g, Oral, TID, [place], APRN - CNP, 20 g at 04/27/23 1319    polyethylene glycol (GLYCOLAX) packet 17 g, 17 g, Oral, Daily, [place], APRN - CNP, 17 g at 04/27/23 1320    sennosides-docusate

## 2023-04-28 NOTE — PROGRESS NOTES
Pt VSS. A&Ox4. Denies any pain. Patient having frequent Bms, refused laxative medications this morning as may be discharged today as well and will take continue to take them at home per patient. Morning medications whole with sips of water. Up independently in the room. Will continue to monitor.

## 2023-04-28 NOTE — PROGRESS NOTES
Page to Dr. Norbert Bower  re: \"Hi Dr. Norbert Bower, pt is on IV antibiotics and has lost IV access and does not want a replacement. Her plan is posssible discharge tomorrow. Is there any way we can switch her to oral antibiotics? Thanks! \"    Awaiting response.

## 2023-04-28 NOTE — CARE COORDINATION
Discharge order noted. Per general surgery note today, Patient is now moving her bowels. Plan is home with  and he will transport her home today.    Pascale Briscoe RN CM  [phone]

## 2023-04-29 LAB — BACTERIA UR CULT: NORMAL

## 2023-06-08 NOTE — DISCHARGE SUMMARY
extended release tablet Take 1 tablet by mouth daily, Disp-30 tablet, R-0Print      torsemide (DEMADEX) 10 MG tablet Take 1 tablet by mouth daily, Disp-30 tablet, R-0Print      potassium chloride (KLOR-CON M) 20 MEQ extended release tablet Take 1 tablet by mouth daily (with breakfast), Disp-60 tablet, R-0Print      vitamin B-12 (CYANOCOBALAMIN) 100 MCG tablet Take 0.5 tablets by mouth dailyHistorical Med      aspirin 81 MG EC tablet Take 1 tablet by mouth dailyHistorical Med             Activity: As tolerated    Diet: Cardiac    Follow-up with Elizabeth Grey MD  [address] [place] [address]    Follow up in 1 week(s)      Davion Mohamud MD  [address] [place] [address] [address] [facility]  [phone]    Follow up          Note that over 30 minutes was spent in preparing discharge papers, discussing discharge with patient, medication review, etc.    Signed:  Bonnie Rivera MD  6/8/2023  2:24 PM

## 2024-01-31 ENCOUNTER — HOSPITAL ENCOUNTER (OUTPATIENT)
Age: 79
Discharge: HOME | End: 2024-01-31
Payer: MEDICARE

## 2024-01-31 ENCOUNTER — OFFICE VISIT (OUTPATIENT)
Dept: CARDIOLOGY CLINIC | Age: 79
End: 2024-01-31
Payer: MEDICARE

## 2024-01-31 VITALS
HEIGHT: 55 IN | OXYGEN SATURATION: 97 % | SYSTOLIC BLOOD PRESSURE: 122 MMHG | DIASTOLIC BLOOD PRESSURE: 60 MMHG | RESPIRATION RATE: 20 BRPM | BODY MASS INDEX: 25.09 KG/M2 | WEIGHT: 108.4 LBS

## 2024-01-31 DIAGNOSIS — I10 PRIMARY HYPERTENSION: ICD-10-CM

## 2024-01-31 DIAGNOSIS — E78.2 MIXED HYPERLIPIDEMIA: ICD-10-CM

## 2024-01-31 DIAGNOSIS — I42.8 NICM (NONISCHEMIC CARDIOMYOPATHY) (HCC): ICD-10-CM

## 2024-01-31 DIAGNOSIS — Z95.2 S/P TAVR (TRANSCATHETER AORTIC VALVE REPLACEMENT): ICD-10-CM

## 2024-01-31 DIAGNOSIS — I25.10 CORONARY ARTERY DISEASE INVOLVING NATIVE CORONARY ARTERY OF NATIVE HEART WITHOUT ANGINA PECTORIS: Primary | ICD-10-CM

## 2024-01-31 LAB
CHOLEST SERPL-MCNC: 150 MG/DL
HDLC SERPL-MCNC: 84 MG/DL
LDLC SERPL CALC-MCNC: 48 MG/DL
TRIGL SERPL-MCNC: 91 MG/DL
VLDLC SERPL CALC-MCNC: 18 MG/DL

## 2024-01-31 PROCEDURE — G8427 DOCREV CUR MEDS BY ELIG CLIN: HCPCS | Performed by: INTERNAL MEDICINE

## 2024-01-31 PROCEDURE — 36415 COLL VENOUS BLD VENIPUNCTURE: CPT

## 2024-01-31 PROCEDURE — 93000 ELECTROCARDIOGRAM COMPLETE: CPT | Performed by: INTERNAL MEDICINE

## 2024-01-31 PROCEDURE — 99214 OFFICE O/P EST MOD 30 MIN: CPT | Performed by: INTERNAL MEDICINE

## 2024-01-31 PROCEDURE — 80061 LIPID PANEL: CPT

## 2024-01-31 PROCEDURE — G8400 PT W/DXA NO RESULTS DOC: HCPCS | Performed by: INTERNAL MEDICINE

## 2024-01-31 PROCEDURE — 1123F ACP DISCUSS/DSCN MKR DOCD: CPT | Performed by: INTERNAL MEDICINE

## 2024-01-31 PROCEDURE — G8419 CALC BMI OUT NRM PARAM NOF/U: HCPCS | Performed by: INTERNAL MEDICINE

## 2024-01-31 PROCEDURE — 1036F TOBACCO NON-USER: CPT | Performed by: INTERNAL MEDICINE

## 2024-01-31 PROCEDURE — 3078F DIAST BP <80 MM HG: CPT | Performed by: INTERNAL MEDICINE

## 2024-01-31 PROCEDURE — 1090F PRES/ABSN URINE INCON ASSESS: CPT | Performed by: INTERNAL MEDICINE

## 2024-01-31 PROCEDURE — G8484 FLU IMMUNIZE NO ADMIN: HCPCS | Performed by: INTERNAL MEDICINE

## 2024-01-31 PROCEDURE — 3074F SYST BP LT 130 MM HG: CPT | Performed by: INTERNAL MEDICINE

## 2024-01-31 NOTE — PROGRESS NOTES
Hypertension     Nonrheumatic aortic valve stenosis 03/12/2022    Critical    NSTEMI (non-ST elevated myocardial infarction) (HCC) 03/11/2022    Pleural effusion 03/12/2022    Bilat       No Known Allergies    Current Outpatient Medications   Medication Sig Dispense Refill    lisinopril (PRINIVIL;ZESTRIL) 30 MG tablet Take 1 tablet by mouth daily 30 tablet 1    rosuvastatin (CRESTOR) 20 MG tablet Take 1 tablet by mouth daily 30 tablet 0    metoprolol succinate (TOPROL XL) 25 MG extended release tablet Take 1 tablet by mouth daily 30 tablet 0    torsemide (DEMADEX) 10 MG tablet Take 1 tablet by mouth daily 30 tablet 0    vitamin B-12 (CYANOCOBALAMIN) 100 MCG tablet Take 0.5 tablets by mouth daily      aspirin 81 MG EC tablet Take 1 tablet by mouth daily      potassium chloride (KLOR-CON M) 20 MEQ extended release tablet Take 1 tablet by mouth daily (with breakfast) (Patient not taking: Reported on 1/31/2024) 60 tablet 0     No current facility-administered medications for this visit.       Social History     Socioeconomic History    Marital status:      Spouse name: Not on file    Number of children: Not on file    Years of education: Not on file    Highest education level: Not on file   Occupational History    Occupation: CNA   Tobacco Use    Smoking status: Never    Smokeless tobacco: Never   Vaping Use    Vaping Use: Never used   Substance and Sexual Activity    Alcohol use: Not Currently    Drug use: Never    Sexual activity: Not on file   Other Topics Concern    Not on file   Social History Narrative    1 cup coffee daily      Social Determinants of Health     Financial Resource Strain: Low Risk  (12/7/2021)    Overall Financial Resource Strain (CARDIA)     Difficulty of Paying Living Expenses: Not hard at all   Food Insecurity: No Food Insecurity (12/7/2021)    Hunger Vital Sign     Worried About Running Out of Food in the Last Year: Never true     Ran Out of Food in the Last Year: Never true

## 2024-01-31 NOTE — PATIENT INSTRUCTIONS
Continue all your medications at current doses.   Please check blood work (Lipid panel)  Restrict sodium intake to less than 2-2.5 g/day. Restrict fluid intake to less than 2.2 L/day. Goal BP is less than 130/80.   Please try to exercise for 150 minutes a week.   I will see you back in the office in  12  months. Please call the office at ([phone], option [phone]) if you have any questions.

## 2024-02-17 ENCOUNTER — APPOINTMENT (OUTPATIENT)
Dept: CT IMAGING | Age: 79
End: 2024-02-17
Payer: MEDICARE

## 2024-02-17 ENCOUNTER — HOSPITAL ENCOUNTER (EMERGENCY)
Age: 79
Discharge: HOME | End: 2024-02-17
Attending: EMERGENCY MEDICINE
Payer: MEDICARE

## 2024-02-17 VITALS
SYSTOLIC BLOOD PRESSURE: 189 MMHG | BODY MASS INDEX: 25.1 KG/M2 | WEIGHT: 108 LBS | OXYGEN SATURATION: 99 % | TEMPERATURE: 98 F | DIASTOLIC BLOOD PRESSURE: 63 MMHG | RESPIRATION RATE: 16 BRPM | HEART RATE: 62 BPM

## 2024-02-17 DIAGNOSIS — S09.90XA CLOSED HEAD INJURY, INITIAL ENCOUNTER: Primary | ICD-10-CM

## 2024-02-17 PROCEDURE — 99284 EMERGENCY DEPT VISIT MOD MDM: CPT

## 2024-02-17 PROCEDURE — 6370000000 HC RX 637 (ALT 250 FOR IP): Performed by: EMERGENCY MEDICINE

## 2024-02-17 PROCEDURE — 70450 CT HEAD/BRAIN W/O DYE: CPT

## 2024-02-17 RX ORDER — ACETAMINOPHEN 500 MG
1000 TABLET ORAL ONCE
Status: COMPLETED | OUTPATIENT
Start: 2024-02-17 | End: 2024-02-17

## 2024-02-17 RX ADMIN — ACETAMINOPHEN 1000 MG: 500 TABLET ORAL at 13:34

## 2024-02-17 ASSESSMENT — PAIN - FUNCTIONAL ASSESSMENT: PAIN_FUNCTIONAL_ASSESSMENT: 0-10

## 2024-02-17 ASSESSMENT — PAIN DESCRIPTION - DESCRIPTORS: DESCRIPTORS: ACHING;SORE;TENDER;THROBBING

## 2024-02-17 ASSESSMENT — PAIN SCALES - GENERAL: PAINLEVEL_OUTOF10: 10

## 2024-02-17 ASSESSMENT — PAIN DESCRIPTION - FREQUENCY: FREQUENCY: CONTINUOUS

## 2024-02-17 ASSESSMENT — PAIN DESCRIPTION - PAIN TYPE: TYPE: ACUTE PAIN

## 2024-02-17 ASSESSMENT — PAIN DESCRIPTION - LOCATION: LOCATION: HEAD

## 2024-02-17 ASSESSMENT — PAIN DESCRIPTION - ONSET: ONSET: ON-GOING

## 2024-02-17 ASSESSMENT — LIFESTYLE VARIABLES
HOW OFTEN DO YOU HAVE A DRINK CONTAINING ALCOHOL: NEVER
HOW MANY STANDARD DRINKS CONTAINING ALCOHOL DO YOU HAVE ON A TYPICAL DAY: PATIENT DOES NOT DRINK

## 2024-02-17 NOTE — DISCHARGE INSTR - COC
Continuity of Care Form    Patient Name: Lissett Sommer   :  1945  MRN:  [id number]    Admit date:  2024  Discharge date:  ***    Code Status Order: Prior   Advance Directives:     Admitting Physician:  No admitting provider for patient encounter.  PCP: Lamonte Palmer MD    Discharging Nurse: ***  Discharging Hospital Unit/Room#: Y[facility]A/YAYA  Discharging Unit Phone Number: ***    Emergency Contact:   Extended Emergency Contact Information  Primary Emergency Contact: Sivakumar Sommer  Address: [address] [place] [address] [place] [address] [facility]  Home Phone: [phone]  Work Phone: [phone]  Mobile Phone: [phone]  Relation: Spouse  Secondary Emergency Contact: ginny romeo  Home Phone: [phone]  Relation: Brother/Sister    Past Surgical History:  Past Surgical History:   Procedure Laterality Date    AORTIC VALVE REPLACEMENT N/A 3/23/2022    TRANSCATHETER AORTIC VALVE REPLACEMENT [place]ez [place] [facility] [place]    CARDIAC CATHETERIZATION  03/15/2022    Dr. Perdomo    HERNIA REPAIR      HYSTERECTOMY, TOTAL ABDOMINAL (CERVIX REMOVED)      JOINT REPLACEMENT  2019    left knee    TRANSESOPHAGEAL ECHOCARDIOGRAM  03/15/2022    Dr Medina       Immunization History:   Immunization History   Administered Date(s) Administered    COVID-19, J&J, (age 18y+), IM, 0.5 mL 11/10/2021    COVID-19, PFIZER Bivalent, DO NOT Dilute, (age 12y+), IM, 30 mcg/0.3 mL 11/10/2022       Active Problems:  Patient Active Problem List   Diagnosis Code    Hypertension I10    NSTEMI (non-ST elevated myocardial infarction) ([facility]) I21.4    Nodular calcific aortic valve stenosis I35.0    S/P TAVR (transcatheter aortic valve replacement) Z95.2    Coronary artery disease involving native coronary artery of native heart without angina pectoris I25.10    Colitis K52.9    NICM (nonischemic cardiomyopathy) ([facility]) I42.8    Mixed hyperlipidemia E78.2       Isolation/Infection:   Isolation            No

## 2024-02-17 NOTE — ED PROVIDER NOTES
[facility] EMERGENCY DEPARTMENT  EMERGENCY DEPARTMENT ENCOUNTER        Pt Name: Lissett Sommer  MRN: [id number]  Birthdate 1945  Date of evaluation: 2/17/2024  Provider: Zeynep Arreola DO  PCP: Lamonte Palmer MD  Note Started: 1:34 PM EST 2/17/24    CHIEF COMPLAINT       Chief Complaint   Patient presents with    Fall     Shoving snow placed the shovel in garage, and it tipped over and hit pt in forehead, causing pt to fall to ground, no LOC, takes asa       HISTORY OF PRESENT ILLNESS: 1 or more Elements       Lissett Sommer is a 78 y.o. female who presents to the emergency department with a chief complaint of hit in the head with a shovel.  The patient states this occurred just prior to arrival.  The patient was shoveling snow and struck in the head with a shovel.  She did not lose consciousness.  She does have mild pain located in frontal region.  It is mild in severity.  Nothing is better.  Not makes it worse.  No treatment prior to arrival.  The patient states that she does take aspirin daily.  Patient has no neck pain, no numbness, weakness or paresthesias    Nursing Notes were all reviewed and agreed with or any disagreements were addressed in the HPI.    REVIEW OF SYSTEMS :      Review of Systems   Constitutional:  Negative for chills and fatigue.   HENT:  Negative for congestion.    Eyes:  Negative for redness.   Respiratory:  Negative for shortness of breath.    Cardiovascular:  Negative for chest pain.   Gastrointestinal:  Negative for abdominal pain, nausea and vomiting.   Genitourinary:  Negative for dysuria.   Musculoskeletal:  Negative for arthralgias.   Skin:  Negative for rash.   Neurological:  Positive for headaches. Negative for light-headedness.   Psychiatric/Behavioral:  Negative for confusion.    All other systems reviewed and are negative.      Positives and Pertinent negatives as per HPI.     SURGICAL HISTORY     Past Surgical History:  Source Pulse Respirations SpO2 Height Weight - Scale   02/17/24 1310 02/17/24 1310 02/17/24 1310 02/17/24 1310 02/17/24 1310 02/17/24 1310 -- 02/17/24 1230   (!) 206/104 98 °F (36.7 °C) Oral 55 16 99 %  49 kg (108 lb)         Constitutional/General: Alert and oriented x3, well appearing, non toxic in NAD  Head: Right frontal soft tissue hematoma  Eyes:  EOMI  Mouth: Oropharynx clear, handling secretions, no trismus  Neck: Supple, full ROM  Pulmonary: Lungs clear to auscultation bilaterally, no wheezes, rales, or rhonchi. Not in respiratory distress  Cardiovascular:  Regular rate and rhythm, no murmurs, gallops, or rubs. 2+ distal pulses  Abdomen: Soft, non tender, non distended,   Extremities: Moves all extremities x 4. Warm and well perfused  Skin: warm and dry without rash  Neurologic: GCS 15, no gross focal neurologic deficits  Psych: Normal Affect      DIAGNOSTIC RESULTS     I have personally reviewed all laboratory and imaging results for this patient. Results are listed below.     LABS:  No results found for this visit on 02/17/24.    RADIOLOGY:  Interpreted by Radiologist.  CT HEAD WO CONTRAST   Final Result   Atrophy and chronic changes seen within the brain no acute intracranial   abnormality.               RADIOLOGY:   Non-plain film images such as CT, Ultrasound and MRI are read by the radiologist. Plain radiographic images are visualized and preliminarily interpreted by the ED Provider       Interpretation per the Radiologist below, if available at the time of this note:    CT HEAD WO CONTRAST   Final Result   Atrophy and chronic changes seen within the brain no acute intracranial   abnormality.           No results found.    No results found.    PROCEDURES   Unless otherwise noted below, none       MDM:   IDr. Arreola am the primary physician of record.    Lissett Sommer is a 78 y.o. female who presents to the ED for head injury  Vital signs upon arrival BP (!) 189/63   Pulse 62   Temp 98 °F (36.7

## 2024-03-18 DIAGNOSIS — Z95.2 S/P TAVR (TRANSCATHETER AORTIC VALVE REPLACEMENT): Primary | ICD-10-CM

## 2024-03-18 NOTE — PROGRESS NOTES
wheezes.   Heart: Normal rate. Regular rhythm. S1 normal and S2 normal. I/VI systolic murmur.   Chest: Symmetric chest wall expansion.   Extremities: Normal range of motion. No edema.      Neurological: Patient is alert and oriented to person, place and time.   Skin: Warm and dry.   Abdomen: Abdomen is soft and non-distended. Bowel sounds are normal.   Pulses: Distal pulses are intact.  Skin: Warm and dry without lesions.        Assessment:   1. Critical aortic stenosis s/p TAVR using #23 S3 Ultra valve   A. POD1: MG 16, DI 0.41, no AI/PVL, moderate MR/TR, EF 28%   B. 30 day: MG 12, DI 0.39, trace PVL, mild MR, EF 30-35%   C. 1 year: MG 17, DI 0.39, trace PVL, mild MR, EF 45-50%   D. 2 year: MG 15, DI 0.47, mild PVL; full report pending  2. Chronic HFrEF  3. CAD - 80% distal LAD; otherwise moderate disease  4. HTN  5. HLD  6. LBBB  7. 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 Follow up for yearly TAVR echo 3/18/2025 at 9:30 am; sooner PRN  2. Follow up with Daysi Christy and Gautam as scheduled  3. SBE prophylaxis reviewed      Electronically signed by Mary Salazar PA-C on 3/18/2024 at 7:48 PM

## 2024-03-19 ENCOUNTER — OFFICE VISIT (OUTPATIENT)
Dept: CARDIOLOGY CLINIC | Age: 79
End: 2024-03-19
Payer: MEDICARE

## 2024-03-19 ENCOUNTER — HOSPITAL ENCOUNTER (OUTPATIENT)
Dept: CARDIOLOGY | Age: 79
Discharge: HOME | End: 2024-03-21
Payer: MEDICARE

## 2024-03-19 VITALS
HEIGHT: 55 IN | HEART RATE: 60 BPM | RESPIRATION RATE: 16 BRPM | BODY MASS INDEX: 25.09 KG/M2 | DIASTOLIC BLOOD PRESSURE: 70 MMHG | SYSTOLIC BLOOD PRESSURE: 140 MMHG | WEIGHT: 108.4 LBS

## 2024-03-19 DIAGNOSIS — Z95.2 S/P TAVR (TRANSCATHETER AORTIC VALVE REPLACEMENT): ICD-10-CM

## 2024-03-19 DIAGNOSIS — Z95.2 S/P TAVR (TRANSCATHETER AORTIC VALVE REPLACEMENT): Primary | ICD-10-CM

## 2024-03-19 LAB
ECHO AR MAX VEL PISA: 3.9 M/S
ECHO AV ACCELERATION TIME: 89.97 MS
ECHO AV AREA PEAK VELOCITY: 1.5 CM2
ECHO AV AREA VTI: 1.5 CM2
ECHO AV AREA/BSA PEAK VELOCITY: 1.1 CM2/M2
ECHO AV AREA/BSA VTI: 1.1 CM2/M2
ECHO AV MEAN GRADIENT: 15 MMHG
ECHO AV MEAN VELOCITY: 1.8 M/S
ECHO AV PEAK GRADIENT: 28 MMHG
ECHO AV PEAK VELOCITY: 2.6 M/S
ECHO AV REGURGITANT PHT: 471.8 MILLISECOND
ECHO AV VELOCITY RATIO: 0.46
ECHO AV VTI: 53.5 CM
ECHO BSA: 1.37 M2
ECHO LV EDV A2C: 42 ML
ECHO LV EDV A4C: 67 ML
ECHO LV EDV BP: 55 ML (ref 56–104)
ECHO LV EDV INDEX A4C: 50 ML/M2
ECHO LV EDV INDEX BP: 41 ML/M2
ECHO LV EDV NDEX A2C: 32 ML/M2
ECHO LV EJECTION FRACTION A2C: 50 %
ECHO LV EJECTION FRACTION A4C: 59 %
ECHO LV EJECTION FRACTION BIPLANE: 56 % (ref 55–100)
ECHO LV ESV A2C: 21 ML
ECHO LV ESV A4C: 28 ML
ECHO LV ESV BP: 24 ML (ref 19–49)
ECHO LV ESV INDEX A2C: 16 ML/M2
ECHO LV ESV INDEX A4C: 21 ML/M2
ECHO LV ESV INDEX BP: 18 ML/M2
ECHO LV FRACTIONAL SHORTENING: 23 % (ref 28–44)
ECHO LV INTERNAL DIMENSION DIASTOLE INDEX: 3.23 CM/M2
ECHO LV INTERNAL DIMENSION DIASTOLIC: 4.3 CM (ref 3.9–5.3)
ECHO LV INTERNAL DIMENSION SYSTOLIC INDEX: 2.48 CM/M2
ECHO LV INTERNAL DIMENSION SYSTOLIC: 3.3 CM
ECHO LV IVSD: 1.2 CM (ref 0.6–0.9)
ECHO LV IVSS: 1.6 CM
ECHO LV MASS 2D: 142.5 G (ref 67–162)
ECHO LV MASS INDEX 2D: 107.1 G/M2 (ref 43–95)
ECHO LV POSTERIOR WALL DIASTOLIC: 0.8 CM (ref 0.6–0.9)
ECHO LV POSTERIOR WALL SYSTOLIC: 1.2 CM
ECHO LV RELATIVE WALL THICKNESS RATIO: 0.37
ECHO LVOT AREA: 3.1 CM2
ECHO LVOT AV VTI INDEX: 0.47
ECHO LVOT DIAM: 2 CM
ECHO LVOT MEAN GRADIENT: 3 MMHG
ECHO LVOT PEAK GRADIENT: 6 MMHG
ECHO LVOT PEAK VELOCITY: 1.2 M/S
ECHO LVOT STROKE VOLUME INDEX: 59.7 ML/M2
ECHO LVOT SV: 79.4 ML
ECHO LVOT VTI: 25.3 CM
ECHO RV FREE WALL PEAK S': 10 CM/S
ECHO RV INTERNAL DIMENSION: 3.3 CM
ECHO RV TAPSE: 1.7 CM (ref 1.7–?)

## 2024-03-19 PROCEDURE — 1036F TOBACCO NON-USER: CPT | Performed by: PHYSICIAN ASSISTANT

## 2024-03-19 PROCEDURE — G8484 FLU IMMUNIZE NO ADMIN: HCPCS | Performed by: PHYSICIAN ASSISTANT

## 2024-03-19 PROCEDURE — G8400 PT W/DXA NO RESULTS DOC: HCPCS | Performed by: PHYSICIAN ASSISTANT

## 2024-03-19 PROCEDURE — 99213 OFFICE O/P EST LOW 20 MIN: CPT | Performed by: PHYSICIAN ASSISTANT

## 2024-03-19 PROCEDURE — G8427 DOCREV CUR MEDS BY ELIG CLIN: HCPCS | Performed by: PHYSICIAN ASSISTANT

## 2024-03-19 PROCEDURE — 3078F DIAST BP <80 MM HG: CPT | Performed by: PHYSICIAN ASSISTANT

## 2024-03-19 PROCEDURE — 1123F ACP DISCUSS/DSCN MKR DOCD: CPT | Performed by: PHYSICIAN ASSISTANT

## 2024-03-19 PROCEDURE — G8419 CALC BMI OUT NRM PARAM NOF/U: HCPCS | Performed by: PHYSICIAN ASSISTANT

## 2024-03-19 PROCEDURE — 3077F SYST BP >= 140 MM HG: CPT | Performed by: PHYSICIAN ASSISTANT

## 2024-03-19 PROCEDURE — 93308 TTE F-UP OR LMTD: CPT

## 2024-03-19 PROCEDURE — 93308 TTE F-UP OR LMTD: CPT | Performed by: INTERNAL MEDICINE

## 2024-03-19 PROCEDURE — 1090F PRES/ABSN URINE INCON ASSESS: CPT | Performed by: PHYSICIAN ASSISTANT

## 2024-03-19 NOTE — PATIENT INSTRUCTIONS
Follow up on 3/18/2025 at 9:30 am for echocardiogram; sooner if needed    Follow up Daysi Palmer and Gautam as scheduled    Reminder: antibiotic required prior to dental appointments

## (undated) DEVICE — SET CARDIAC I

## (undated) DEVICE — GUIDEWIRE ANGIO L150CM OD0.035IN STR FIX PTFE SLD SUPP

## (undated) DEVICE — SYRINGE MED 50ML LUERLOCK TIP

## (undated) DEVICE — NEEDLE SPNL 20GA L3.5IN YEL HUB S STL REG WALL FIT STYL W/

## (undated) DEVICE — INTENDED FOR TISSUE SEPARATION, AND OTHER PROCEDURES THAT REQUIRE A SHARP SURGICAL BLADE TO PUNCTURE OR CUT.: Brand: BARD-PARKER ® STAINLESS STEEL BLADES

## (undated) DEVICE — COVER PRB W14XL147CM TELESCOPICALLY FLD EXT LEN CIV-FLEX

## (undated) DEVICE — GUIDEWIRE VASC L260CM DIA0.035IN TAPR L11CM FLPY TIP L4CM

## (undated) DEVICE — SOLUTION IV 1000ML 0.9% SOD CHL PH 5 INJ USP VIAFLX PLAS

## (undated) DEVICE — PICO 7 10CM X 20CM: Brand: PICO™ 7

## (undated) DEVICE — DRAPE,REIN 53X77,STERILE: Brand: MEDLINE

## (undated) DEVICE — DRESSING TRNSPAR W4XL55IN ACRYL SUP FLM W ADH WTRPRF OPSITE

## (undated) DEVICE — HOLDER NDL WEBST SNAG FREE

## (undated) DEVICE — SOLUTION IRRIG 1000ML STRL H2O USP PLAS POUR BTL

## (undated) DEVICE — GLOVE ORANGE PI 7   MSG9070

## (undated) DEVICE — KIT MED IMAG CNTRST AGNT W/ IOPAMIDOL REUSE

## (undated) DEVICE — STAPLER SKIN L39MM DIA0.53MM CRWN 5.7MM S STL FIX HD PROX

## (undated) DEVICE — SYRINGE 20ML LL S/C 50

## (undated) DEVICE — CATHETER PACE 5FR L110CM 6FR INTRO D10CM 1MM SPACE POLYUR
Type: IMPLANTABLE DEVICE | Status: NON-FUNCTIONAL
Removed: 2022-03-23

## (undated) DEVICE — DRAPE EQUIP BANDED BG 36X28 IN W/ROUNDED CORNER SNAPKOVER

## (undated) DEVICE — GLOVE SURG SZ 6 THK91MIL LTX FREE SYN POLYISOPRENE ANTI

## (undated) DEVICE — MEDI-TRACE CADENCE ADULT, PRE-CONNECT  DEFIBRILLATION ELECTRODE (10 PR/PK) - PHYSIO-CONTROL: Brand: MEDI-TRACE CADENCE

## (undated) DEVICE — BLADE CLIPPER GEN PURP NS

## (undated) DEVICE — PERCUTANEOUS ENTRY THINWALL NEEDLE  ONE-PART: Brand: COOK

## (undated) DEVICE — PACK SURG CARDIAC CATH DYNJ38860] MEDLINE INDUSTRIES INC]

## (undated) DEVICE — SET TRNQT W/ STD 7IN 12FR 5 TB 1 SNR DLP

## (undated) DEVICE — CATHETER DIAG 5FR L110CM ID0.045IN VENT VASC PGTL 145 EXPO

## (undated) DEVICE — KIT HND CTRL 3 W STPCOCK ROT END 54IN PREM HI PRSS TBNG AT

## (undated) DEVICE — LABEL MED CARD SURG 4 IN PANEL STRL

## (undated) DEVICE — ELECTRODE PT RET AD L9FT HI MOIST COND ADH HYDRGEL CORDED

## (undated) DEVICE — GOWN,SIRUS,FABRNF,XL,20/CS: Brand: MEDLINE

## (undated) DEVICE — SET CARDIAC II

## (undated) DEVICE — PADDLE INTERN DEFIB CHILD

## (undated) DEVICE — PERCLOSE™ PROSTYLE™ SUTURE-MEDIATED CLOSURE AND REPAIR SYSTEM: Brand: PERCLOSE™ PROSTYLE™

## (undated) DEVICE — CLIP LIG M BLU TI HRT SHP WIRE HORZ 600 PER BX

## (undated) DEVICE — LOOP VES W25MM THK1MM MAXI RED SIL FLD REPELLENT 100 PER

## (undated) DEVICE — INTRODUCER SHTH 6FR L12CM DIA0.038IN HEMSTAS CLOSE TOL

## (undated) DEVICE — CATH ANGIO 5FR .045IN AL1 100CM EXPO

## (undated) DEVICE — SURGICAL PROCEDURE PACK BASIC

## (undated) DEVICE — 3M™ IOBAN™ 2 ANTIMICROBIAL INCISE DRAPE 6650EZ: Brand: IOBAN™ 2

## (undated) DEVICE — ALCOHOL RUBBING ISO 16OZ 70%

## (undated) DEVICE — GOWN,SIRUS,FABRNF,L,20/CS: Brand: MEDLINE

## (undated) DEVICE — GLOVE ORANGE PI 7 1/2   MSG9075

## (undated) DEVICE — DRESSING FOAM W22XL25CM FILVE LAYR FOAM DP DEF SAFETAC

## (undated) DEVICE — SYRINGE MED 10ML LUERLOCK TIP W/O SFTY DISP

## (undated) DEVICE — TUBING PRSS MON L12IN PVC RIG NONEXPANDING M TO FEM CONN

## (undated) DEVICE — INTRO SHEATH W/6.0FRX10CMX.0385IN

## (undated) DEVICE — GUIDEWIRE VASC L260CM 0.035IN J TIP L3MM PTFE FIX COR NAMIC

## (undated) DEVICE — CATHETER DIAG AD 5FR L100CM COR GRY HYDRPHLC NYL MPA 2 W/ 2

## (undated) DEVICE — DRAPE SHEET: Brand: UNBRANDED

## (undated) DEVICE — COVER,TABLE,60X90,STERILE: Brand: MEDLINE

## (undated) DEVICE — GLOVE SURG SZ 65 THK91MIL LTX FREE SYN POLYISOPRENE

## (undated) DEVICE — DRAPE, MULTI FENESTRATED, HYBRID OR, STE: Brand: MEDLINE

## (undated) DEVICE — TAPE ADH W2INXL10YD PLAS TRNSPAR H2O RESIST HYPOALRG CURAD

## (undated) DEVICE — MEDIA CONTRAST RX ISOVUE-300 61% 30ML VIALS

## (undated) DEVICE — TOWEL,OR,DSP,ST,BLUE,STD,6/PK,12PK/CS: Brand: MEDLINE

## (undated) DEVICE — AMPLATZ EXTRA STIFF WIRE GUIDE: Brand: AMPLATZ

## (undated) DEVICE — AGENT HEMSTAT W4XL8IN OXIDIZED REGENERATED CELOS ABSRB

## (undated) DEVICE — KIT MFLD ISOLATN NACL CNTRST PRT TBNG SPIK W/ PRSS TRNSDUC

## (undated) DEVICE — ADHESIVE SKIN CLOSURE TOP 36 CC HI VISC DERMBND MINI

## (undated) DEVICE — CLOTH SURG PREP PREOPERATIVE CHLORHEXIDINE GLUC 2% READYPREP

## (undated) DEVICE — ANGIOPLASTY ADD-ON PACK: Brand: MEDLINE INDUSTRIES, INC.

## (undated) DEVICE — LARGE BORE STOPCOCK WITH ROTATING MALE LUER LOCK

## (undated) DEVICE — APPLICATOR MEDICATED 26 CC SOLUTION HI LT ORNG CHLORAPREP

## (undated) DEVICE — GUIDEWIRE VASC L145CM 0.035IN J TIP L3MM PTFE FIX COR NAMIC

## (undated) DEVICE — SET INTRO SHTH 5FR L45CM GRY INTEGR SIDE PRT HAEMOSTASIS

## (undated) DEVICE — BOWL MED L 32OZ PLAS W/ MOLD GRAD EZ OPN PEEL PCH

## (undated) DEVICE — CATHETER ANGIO PIG 0.045 IN 5 FRX110 CM VENTRICULAR EXPO

## (undated) DEVICE — GLOVE ORANGE PI 8   MSG9080